# Patient Record
Sex: MALE | Race: WHITE | NOT HISPANIC OR LATINO | Employment: FULL TIME | URBAN - METROPOLITAN AREA
[De-identification: names, ages, dates, MRNs, and addresses within clinical notes are randomized per-mention and may not be internally consistent; named-entity substitution may affect disease eponyms.]

---

## 2017-11-17 ENCOUNTER — HOSPITAL ENCOUNTER (EMERGENCY)
Facility: HOSPITAL | Age: 37
Discharge: HOME | End: 2017-11-17
Attending: FAMILY MEDICINE
Payer: COMMERCIAL

## 2017-11-17 VITALS
HEIGHT: 66 IN | WEIGHT: 220 LBS | TEMPERATURE: 97 F | SYSTOLIC BLOOD PRESSURE: 190 MMHG | BODY MASS INDEX: 35.36 KG/M2 | HEART RATE: 92 BPM | DIASTOLIC BLOOD PRESSURE: 88 MMHG | OXYGEN SATURATION: 95 % | RESPIRATION RATE: 16 BRPM

## 2017-11-17 DIAGNOSIS — S82.892A AVULSION FRACTURE OF ANKLE, LEFT, CLOSED, INITIAL ENCOUNTER: Primary | ICD-10-CM
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
## 2017-11-17 DIAGNOSIS — M25.572 LEFT ANKLE PAIN: ICD-10-CM
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
## 2017-11-17 PROCEDURE — 99283 EMERGENCY DEPT VISIT LOW MDM: CPT | Mod: ,,, | Performed by: PHYSICIAN ASSISTANT

## 2017-11-17 PROCEDURE — 99283 EMERGENCY DEPT VISIT LOW MDM: CPT

## 2017-11-17 NOTE — ED PROVIDER NOTES
Encounter Date: 11/17/2017       History     Chief Complaint   Patient presents with    Ankle Injury     Pt states he twisted his left ankle a few days ago.      Patient is a 37-year-old male who presents the ED with left ankle pain.  Patient states that he twisted his ankle one week ago when he exited his front door.  Patient states that his rug was not there which made his front steps slippery.  Patient has been taking Tylenol and applying ice since, but presents today with ongoing pain.  Currently his pain is 2-3/10 that is worse with ambulation.  He notes swelling to his left ankle.  He is still able to bear weight.  He notices intermittent tingling, but is never constant.  He has no other complaint at this time.          Review of patient's allergies indicates:  No Known Allergies  History reviewed. No pertinent past medical history.  Past Surgical History:   Procedure Laterality Date    CHOLECYSTECTOMY       Family History   Problem Relation Age of Onset    No Known Problems Mother     Heart disease Father      Social History   Substance Use Topics    Smoking status: Never Smoker    Smokeless tobacco: Never Used    Alcohol use No     Review of Systems   Constitutional: Negative for chills and fever.   HENT: Negative for sore throat.    Eyes: Negative for visual disturbance.   Respiratory: Negative for shortness of breath.    Cardiovascular: Negative for chest pain.   Gastrointestinal: Negative for nausea.   Genitourinary: Negative for dysuria.   Musculoskeletal: Positive for arthralgias, gait problem and joint swelling. Negative for back pain.   Skin: Negative for rash.   Neurological: Negative for facial asymmetry and weakness.   Hematological: Does not bruise/bleed easily.       Physical Exam     Initial Vitals [11/17/17 1040]   BP Pulse Resp Temp SpO2   (!) 190/88 92 16 97.3 °F (36.3 °C) 95 %      MAP       122         Physical Exam    Vitals reviewed.  Constitutional: He appears well-developed and  well-nourished. He is not diaphoretic. No distress.   HENT:   Head: Normocephalic and atraumatic.   Nose: Nose normal.   Eyes: Conjunctivae and EOM are normal.   Neck: Normal range of motion.   Cardiovascular: Normal rate, regular rhythm and normal heart sounds. Exam reveals no friction rub.    No murmur heard.  Pulses:       Dorsalis pedis pulses are 2+ on the left side.   Pulmonary/Chest: Breath sounds normal. No respiratory distress. He has no wheezes. He has no rales.   Abdominal: Soft. Bowel sounds are normal. He exhibits no distension. There is no tenderness.   Musculoskeletal:        Left ankle: He exhibits decreased range of motion and swelling. He exhibits no ecchymosis, no deformity, no laceration and normal pulse. Tenderness. Achilles tendon exhibits normal [employer] test results.        Feet:    Neurological: He is alert and oriented to person, place, and time. He has normal strength. No sensory deficit.   Skin: Skin is warm and dry. No bruising, no ecchymosis, no laceration and no rash noted. No erythema.   Psychiatric: He has a normal mood and affect. Thought content normal.         ED Course   Procedures  Labs Reviewed - No data to display          Medical Decision Making:   History:   Old Medical Records: I decided to obtain old medical records.  Clinical Tests:   Radiological Study: Ordered and Reviewed    Imaging Results          X-Ray Ankle Complete Left (Final result)  Result time 11/17/17 12:30:36    Final result by Antoni Mir MD (11/17/17 12:30:36)                 Impression:      1.  Cortical irregularity of the distal medial malleolus, some of which appears well-corticated, other regions more angulated.  This could reflect sequela of remote injury with chronic nonunion however acute avulsion cannot be excluded especially given edema overlying the region.  Correlation advised.      Electronically signed by: ANTONI MIR MD  Date:     11/17/17  Time:    12:30              Narrative:     Ankle complete 3 view left    Clinical history: Pain in left ankle    Comparison: None    Findings:  3 views.    There is cortical irregularity involving the distal aspect of the medial malleolus, most of which appears well-corticated however there are some regions with more angulated margins.  This may reflect sequela of remote injury with nonunion versus degenerative change however acute component, of avulsion, is not excluded, noting there is diffuse edema about the medial malleolus.  The ankle mortise is otherwise grossly intact.  No radiopaque foreign body.  Degenerative changes are noted at the calcaneus.                                 APC / Resident Notes:   X-ray shows cortical irregularity at the distal medial malleolus.  This could reflect remote injury with chronic nonunion or acute avulsion.    Patient updated with results.  I will treat him for acute fractured ankle given his injury and onset of pain was 1 week ago.  I will put him an air splint and supply him with crutches.  Continue Tylenol and RICE therapy as discussed. Follow-up with [facility] orthopedics for further evaluation and management.  I gave him proper instructions.  All questions answered.  He is comfortable with plan and stable for discharge.  I reviewed patient's chart and discussed this case with my supervising M.D.              ED Course      Clinical Impression:   The primary encounter diagnosis was Avulsion fracture of ankle, left, closed, initial encounter. A diagnosis of Left ankle pain was also pertinent to this visit.    Disposition:   Disposition: Discharged  Condition: Stable                        Melisa Flores PA-C  11/17/17 5502

## 2017-11-17 NOTE — ED TRIAGE NOTES
Twisted his left ankle several days ago and it continues to be swollen and painful.    GENERAL: The patient is well-developed and well-nourished in no apparent distress. Alert and oriented x4.                                                HEENT: Head is normocephalic and atraumatic. Extraocular muscles are intact. Pupils are equal, round, and reactive to light and accommodation. Nares appeared normal. Mouth is well hydrated and without lesions. Mucous membranes are moist. Posterior pharynx clear of any exudate or lesions.    NECK: Supple. No carotid bruits. No lymphadenopathy or thyromegaly.    LUNGS: Clear to auscultation.    HEART: Regular rate and rhythm without murmur.     ABDOMEN: Soft, nontender, and nondistended. Positive bowel sounds. No hepatosplenomegaly was noted.     EXTREMITIES: Without any cyanosis, clubbing, rash, or lesions. Edema noted left lateral and medial ankle.    NEUROLOGIC: Cranial nerves II through XII are grossly intact.     PSYCHIATRIC: Flat affect, but denies suicidal or homicidal ideations.    SKIN: No ulceration or induration present.

## 2017-11-17 NOTE — DISCHARGE INSTRUCTIONS
You have an acute avulsion of the left distal medial malleolus.    Call the Scheduling Department at [facility] ([facility]) at [phone] and tell them that you need to make an orthopedic appointment for a follow up on a fracture. Bring these discharge papers with you to the clinic appointment.   Take tylenol as needed for pain relief. Rest. Ice. Elevate ankle higher than the level of your heart.     No future appointments.    Our goal in the emergency department is to always give you outstanding care and exceptional service. You may receive a survey by mail or e-mail in the next week regarding your experience in our ED. We would greatly appreciate your completing and returning the survey. Your feedback provides us with a way to recognize our staff who give very good care and it helps us learn how to improve when your experience was below our aspiration of excellence.

## 2020-09-21 ENCOUNTER — HISTORICAL (OUTPATIENT)
Dept: ADMINISTRATIVE | Facility: HOSPITAL | Age: 40
End: 2020-09-21

## 2020-09-21 LAB
ABS NEUT (OLG): 4.9 X10(3)/MCL (ref 2.1–9.2)
ALBUMIN SERPL-MCNC: 4.6 GM/DL (ref 3.4–5)
ALBUMIN/GLOB SERPL: 1.92 {RATIO} (ref 1.5–2.5)
ALP SERPL-CCNC: 44 UNIT/L (ref 38–126)
ALT SERPL-CCNC: 37 UNIT/L (ref 7–52)
APPEARANCE, UA: CLEAR
AST SERPL-CCNC: 19 UNIT/L (ref 15–37)
BACTERIA #/AREA URNS AUTO: ABNORMAL /HPF
BILIRUB SERPL-MCNC: 0.4 MG/DL (ref 0.2–1)
BILIRUB UR QL STRIP: NEGATIVE MG/DL
BILIRUBIN DIRECT+TOT PNL SERPL-MCNC: 0.1 MG/DL (ref 0–0.5)
BILIRUBIN DIRECT+TOT PNL SERPL-MCNC: 0.3 MG/DL
BUN SERPL-MCNC: 15 MG/DL (ref 7–18)
CALCIUM SERPL-MCNC: 10 MG/DL (ref 8.5–10.1)
CHLORIDE SERPL-SCNC: 103 MMOL/L (ref 98–107)
CHOLEST SERPL-MCNC: 259 MG/DL (ref 0–200)
CHOLEST/HDLC SERPL: 4.9 {RATIO}
CO2 SERPL-SCNC: 26 MMOL/L (ref 21–32)
COLOR UR: YELLOW
CREAT SERPL-MCNC: 0.87 MG/DL (ref 0.6–1.3)
ERYTHROCYTE [DISTWIDTH] IN BLOOD BY AUTOMATED COUNT: 12.9 % (ref 11.5–17)
GLOBULIN SER-MCNC: 2.4 GM/DL (ref 1.2–3)
GLUCOSE (UA): NEGATIVE MG/DL
GLUCOSE SERPL-MCNC: 98 MG/DL (ref 74–106)
HCT VFR BLD AUTO: 46 % (ref 42–52)
HDLC SERPL-MCNC: 53 MG/DL (ref 35–60)
HGB BLD-MCNC: 15.3 GM/DL (ref 14–18)
HGB UR QL STRIP: ABNORMAL UNIT/L
KETONES UR QL STRIP: NEGATIVE MG/DL
LDLC SERPL CALC-MCNC: 201 MG/DL (ref 0–129)
LEUKOCYTE ESTERASE UR QL STRIP: NEGATIVE UNIT/L
LYMPHOCYTES # BLD AUTO: 3 X10(3)/MCL (ref 0.6–3.4)
LYMPHOCYTES NFR BLD AUTO: 34.7 % (ref 13–40)
MCH RBC QN AUTO: 28.7 PG (ref 27–31.2)
MCHC RBC AUTO-ENTMCNC: 33 GM/DL (ref 32–36)
MCV RBC AUTO: 86 FL (ref 80–94)
MONOCYTES # BLD AUTO: 0.7 X10(3)/MCL (ref 0.1–1.3)
MONOCYTES NFR BLD AUTO: 8.1 % (ref 0.1–24)
NEUTROPHILS NFR BLD AUTO: 57.2 % (ref 47–80)
NITRITE UR QL STRIP.AUTO: NEGATIVE
PH UR STRIP: 5.5 [PH]
PLATELET # BLD AUTO: 249 X10(3)/MCL (ref 130–400)
PMV BLD AUTO: 8.7 FL (ref 9.4–12.4)
POTASSIUM SERPL-SCNC: 4.5 MMOL/L (ref 3.5–5.1)
PROT SERPL-MCNC: 7 GM/DL (ref 6.4–8.2)
PROT UR QL STRIP: NEGATIVE MG/DL
RBC # BLD AUTO: 5.33 X10(6)/MCL (ref 4.7–6.1)
RBC #/AREA URNS HPF: ABNORMAL /HPF
SODIUM SERPL-SCNC: 139 MMOL/L (ref 136–145)
SP GR UR STRIP: 1.01
SQUAMOUS EPITHELIAL, UA: ABNORMAL /LPF
TRIGL SERPL-MCNC: 137 MG/DL (ref 30–150)
TSH SERPL-ACNC: 1.07 MIU/ML (ref 0.35–4.94)
UROBILINOGEN UR STRIP-ACNC: 0.2 MG/DL
VLDLC SERPL CALC-MCNC: 27.4 MG/DL
WBC # SPEC AUTO: 8.6 X10(3)/MCL (ref 4.5–11.5)
WBC #/AREA URNS AUTO: ABNORMAL /[HPF]

## 2021-07-02 ENCOUNTER — HISTORICAL (OUTPATIENT)
Dept: ADMINISTRATIVE | Facility: HOSPITAL | Age: 41
End: 2021-07-02

## 2021-07-02 LAB
APPEARANCE, UA: ABNORMAL
BACTERIA #/AREA URNS AUTO: ABNORMAL /HPF
BILIRUB UR QL STRIP: NEGATIVE MG/DL
COLOR UR: YELLOW
GLUCOSE (UA): NEGATIVE MG/DL
HGB UR QL STRIP: ABNORMAL UNIT/L
KETONES UR QL STRIP: NEGATIVE MG/DL
LEUKOCYTE ESTERASE UR QL STRIP: NEGATIVE UNIT/L
NITRITE UR QL STRIP.AUTO: NEGATIVE
PH UR STRIP: 6 [PH]
PROT UR QL STRIP: NEGATIVE MG/DL
PSA SERPL-MCNC: 0.51 NG/ML (ref 0–2.5)
RBC #/AREA URNS HPF: ABNORMAL /HPF
SP GR UR STRIP: 1.02
SQUAMOUS EPITHELIAL, UA: ABNORMAL /LPF
UROBILINOGEN UR STRIP-ACNC: 0.2 MG/DL
WBC #/AREA URNS AUTO: ABNORMAL /[HPF]

## 2021-07-16 ENCOUNTER — HISTORICAL (OUTPATIENT)
Dept: ADMINISTRATIVE | Facility: HOSPITAL | Age: 41
End: 2021-07-16

## 2021-07-16 LAB
AFP-TM SERPL-MCNC: <2 NG/ML
B-HCG FREE SERPL-ACNC: 16.82 MIU/ML
LDH SERPL-CCNC: 175 UNIT/L (ref 140–271)

## 2021-08-09 ENCOUNTER — HISTORICAL (OUTPATIENT)
Dept: ADMINISTRATIVE | Facility: HOSPITAL | Age: 41
End: 2021-08-09

## 2021-08-09 LAB
AFP-TM SERPL-MCNC: <2 NG/ML
B-HCG FREE SERPL-ACNC: <2.42 MIU/ML
LDH SERPL-CCNC: 167 UNIT/L (ref 140–271)

## 2021-12-23 ENCOUNTER — HISTORICAL (OUTPATIENT)
Dept: ADMINISTRATIVE | Facility: HOSPITAL | Age: 41
End: 2021-12-23

## 2021-12-23 LAB
AFP-TM SERPL-MCNC: <2 NG/ML
B-HCG FREE SERPL-ACNC: <2.42 MIU/ML
LDH SERPL-CCNC: 133 UNIT/L (ref 140–271)

## 2022-04-10 ENCOUNTER — HISTORICAL (OUTPATIENT)
Dept: ADMINISTRATIVE | Facility: HOSPITAL | Age: 42
End: 2022-04-10
Payer: COMMERCIAL

## 2022-04-25 VITALS
SYSTOLIC BLOOD PRESSURE: 123 MMHG | BODY MASS INDEX: 39.06 KG/M2 | HEIGHT: 67 IN | WEIGHT: 248.88 LBS | DIASTOLIC BLOOD PRESSURE: 77 MMHG

## 2022-05-03 NOTE — HISTORICAL [facility] CERNER
This is a historical note converted from [employer]. Formatting and pictures may have been removed.  Please reference Emmett for original formatting and attached multimedia. Chief Complaint  NP HOSP F/U  History of Present Illness  Patient presents to reestablish.? He was last seen here in 2016.  Has been having pain in abdomen since June. Had gone to [facility] ER then.  , 3 children. Works at [employer], [employer].? No nicotine or etoh. Exercise : rare since developing pain this summer. Pain is exacerbated with exercise.  ?  Father 60 : Aortic aneurysm  Mother 62 : Hearing impaired  Sister 39 : healthy  PGF : hearing impaired.  ?  PSHx : Cholecystectomy in 2016, Dr Pascual.  Review of Systems  General:???????????Hearing impaired. Says BP is elevated when he gets nervous. Has gained 20 lbs this year.????  Integument:???????? Denies any nevus changes, rashes, urticaria, ?or sores.? Also denies itching or areas of numbness.  HEENT:?????????????????? Denies vision changes or eye pain.? No sore throat, ear pain, sinus pressure or discharge.  Cardiovascular:??? Denies chest pain, palpitations, dyspnea on exertion, orthopnea.  Respiratory:???????? No cough, wheezing, shortness of breath, or sputum.  GI:?????????????????????????See HPI.? Denies nausea, emesis, constipation, diarrhea, melena, or hematochezia  :??????????????????????? No frequency, urgency, hematuria, discharge, or incontinence  MS:?????????????????????? Denies myalgias, arthralgias, joint effusion, edema, or weakness  Neuro:????????????????? No headaches, numbness in extremities, tingling, dizziness, or weakness  Psych:?????????????????? Denies anxiety, depression, suicidal or homicidal ideations, or irritability  Endo:??????????????????? Denies polyuria, polydipsia, polyphagia  Heme:????????????????? No abnormal bleeding or bruising. No lymph node enlargement or pain.  Physical Exam  Vitals & Measurements  HR:?78(Peripheral)? BP:?130/99?  HT:?169?cm?  HT:?169.00?cm? WT:?117.1?kg? WT:?117.100?kg? BMI:?41?  General :?????Well-developed and? nourished, no apparent distress, alert and oriented ?4  HEENT:????? TMs and EACs within normal limits,?nasal mucosa within normal limits?with no discharge,?oropharynx clear  Integument :????? Skin is intact with no erythema.? No pustules or vesicles.? No rash or scale. No Lymphadenopathy.? No urticaria.? No abnormal nevi  Neck :?????Supple, no lymphadenopathy, no thyromegaly, no bruits, no jugular venous distention  Cardiovascular :?????? Regular rhythm and rate, no murmurs, radial and dorsal pedal pulses 2+ bilaterally  Respiratory :??????Lungs clear to auscultation bilaterally, no wheezes, no crackles, no rhonchi.? Good air movement  Abdomen :?????Obese,?NABS, soft, tender to palpation in?right?mid abdomen, no hernia appreciated on Valsalva. ?A few lipomas identified.??No guarding or rebound.??No hepatosplenomegaly or?masses.??????????????????  Ext:??????? No muscle tenderness, joints WNL, FROM, negative SLR, no?CCE  Neuro :? ?????? CN II-XII intact, reflexes 2+ throughout, no motor sensory deficits, negative?cerebellar? tests  Heme :?????? No bruising or petechiae?  Back:??????? no CVAT   : no hernia appreciated but pain in R inguinal area with Valsalva. Mild tenderness right testis compared to left. No masses or swelling appreciated.  Assessment/Plan  1.?Wellness examination?Z00.00  Age-appropriate wellness topics discussed.? He strongly encouraged to lose weight and resume exercise.? We will check a CBC, TSH, lipids, CMP, and UA.  Ordered:  [facility] New 40-64 years [id number] PC, Wellness examination  Abdominal pain  Elevated BP without diagnosis of hypertension  Morbid obesity  Hearing impaired, [facility] - [facility], 09/21/20 11:42:00 CDT  ?  2.?Abdominal pain?R10.9  ?Will order CT of the abdomen.? If CT and labs are normal then I will refer him to a general surgeon for evaluation.  Ordered:  Schedule  Diagnostics Study, CT of Abdomen without contrast, no Oral Contrast Requested, routine, 09/21/20 11:43:00 CDT, Abdominal pain  ?  3.?Elevated BP without diagnosis of hypertension?R03.0  He is asymptomatic.??Advised to monitor blood pressure at home?in the ?morning and at bedtime?and to contact us if it is consistently greater than 140/90.? Also he?is to call us if he becomes symptomatic.  Ordered:  Clinic Follow up, *Est. 11/23/20 11:30:00 CST, Order for future visit, Elevated BP without diagnosis of hypertension, [facility]  [facility] New 40-64 years [id number] PC, Wellness examination  Abdominal pain  Elevated BP without diagnosis of hypertension  Morbid obesity  Hearing impaired, [facility], 09/21/20 11:42:00 CDT  ?  4.?Morbid obesity?E66.01  ?Long-term adverse consequence of obesity discussed with patient.  Ordered:  [facility] New 40-64 years [id number] PC, Wellness examination  Abdominal pain  Elevated BP without diagnosis of hypertension  Morbid obesity  Hearing impaired, [facility], 09/21/20 11:42:00 CDT  ?  5.?Hearing impaired?H91.90  ?He is able to communicate by lip reading.  Ordered:  [facility] 40-64 years [id number] PC, Wellness examination  Abdominal pain  Elevated BP without diagnosis of hypertension  Morbid obesity  Hearing impaired, [facility] - [facility], 09/21/20 11:42:00 CDT  ?  Orders:  1036F Current tobacco non-user, 09/21/20 11:42:00 CDT  1170F Functional Status Assessment, 09/21/20 11:42:00 CDT  3008F Body Mass Index (BMI), documented, 09/21/20 11:42:00 CDT  3075F Most recent systolic blood pressure, 130 to 139 mm Hg, 09/21/20 11:42:00 CDT  3080F Most recent diastolic blood pressure, greater than or equal to 90 mm Hg, 09/21/20 11:42:00 CDT  Clinic Follow-up PRN, 09/21/20 11:42:00 CDT, [facility] - [facility], Future Order  Referrals  Clinic Follow up, *Est. 11/23/20 11:30:00 CST, Order for future visit, Elevated BP without diagnosis of hypertension,  [facility]  Clinic Follow-up PRN, 09/21/20 11:42:00 CDT, [facility], Future Order   Problem List/Past Medical History  Ongoing  Morbid obesity  Historical  Migraine  Procedure/Surgical History  Cholecystectomy Laparoscopic (None) (06/08/2016)  Laparoscopy, surgical; cholecystectomy (06/08/2016)  Resection of Gallbladder, Percutaneous Endoscopic Approach (06/08/2016)  colonoscopy  NO SURGERIES   Medications  Zofran ODT 4 mg oral tablet, disintegrating, 4 mg= 1 tab(s), Oral, TID  Allergies  Latex rubber gloves?(RASH ONLY)  Peanuts?(UPSET STOMACH,HIVES)  Seafood?(HIVES)  aspirin?(NAUSEA,UPSET STOMACH)  Social History  Abuse/Neglect  No, 09/21/2020  Alcohol  Current, 06/05/2016  Employment/School  Employed, Highest education level: Post graduate degree(s)., 06/05/2016  Home/Environment  Lives with Spouse. Living situation: Home/Independent. Family/Friends available for support: Yes., 06/05/2016  Nutrition/Health  Type of diet: GLUTEN FREE. Diet restrictions: PEANUTS., 06/05/2016  Substance Use  Never, 06/05/2016  Tobacco - Denies Tobacco Use, 05/29/2013  Former smoker, quit more than 30 days ago, N/A, 09/21/2020  Former smoker, Cigarettes, 06/05/2016  Family History  Cancer - unknown origin: Grandfather.  Heart disease: Father.  Hyperlipidemia.: Father.  Hypertension.: Father.  Skin cancer: Grandmother.  Health Maintenance  Health Maintenance  ???Pending?(in the next year)  ??? ??OverDue  ??? ? ? ?Alcohol Misuse Screening due??01/02/20??and every 1??year(s)  ??? ??Due?  ??? ? ? ?ADL Screening due??09/22/20??and every 1??year(s)  ??? ? ? ?Tetanus Vaccine due??09/22/20??and every 10??year(s)  ??? ??Due In Future?  ??? ? ? ?Obesity Screening not due until??01/01/21??and every 1??year(s)  ???Satisfied?(in the past 1 year)  ??? ??Satisfied?  ??? ? ? ?Obesity Screening on??09/21/20.??Satisfied by Pushpa Bennett CMA  ?

## 2022-05-03 NOTE — HISTORICAL [facility] C[facility]ER
This is a historical note converted from Emmett. Formatting and pictures may have been removed.  Please reference Emmett for original formatting and attached multimedia. Chief Complaint  LUMP ON RIGHT TESTICLE/ LOWER ABD PAIN  History of Present Illness  Patient with bump in right scrotum for a week. Right testicle is also sore. No known trauma. No hx of  surgery.  . Pain with ejaculation on Sunday.  Review of Systems  See HPI.? He denies?hematuria or frequency.? Denies constipation or diarrhea.? He denies cardiac or respiratory complaints.  Physical Exam  Vitals & Measurements  HR:?78(Peripheral)? BP:?123/77?  HT:?169?cm? HT:?169.00?cm? WT:?112.9?kg? WT:?112.900?kg? BMI:?39.53?  General: Patient is?alert and oriented, NAD on room air  CV: Regular rate and rhythm, no murmur  Lungs: CTA B  ABD: Soft, NABS,?obese, nontender  :?Normal penis.? Tender?mass on the right side of scrotum just above the testicle. ?Mass measures 1.5 cm in diameter.? Left testicle is normal.  Rectal:?Normal sized, nontender prostate without nodules  Back: No CVAT  Assessment/Plan  1.?Scrotal mass?N50.89  ?Will order a scrotal ultrasound, follow-up?to be determined.  2.?Epididymitis?N45.1  ?Will prescribe 21 days of Cipro 500 mg twice daily.  3.?Pain with ejaculation?N53.12  Referrals  Clinic Follow-up PRN, 07/02/21 9:20:00 CDT, [facility] - [facility], Future Order   Problem List/Past Medical History  Ongoing  HTN (hypertension)  Hypercholesterolemia  Morbid obesity  Historical  Migraine  Procedure/Surgical History  Cholecystectomy Laparoscopic (None) (06/08/2016)  Laparoscopy, surgical; cholecystectomy (06/08/2016)  Resection of Gallbladder, Percutaneous Endoscopic Approach (06/08/2016)  colonoscopy  NO SURGERIES   Medications  Cipro 500 mg oral tablet, 500 mg= 1 tab(s), Oral, q12hr  rosuvastatin 10 mg oral capsule, 10 mg= 1 cap(s), Oral, Daily, 5 refills  valsartan 80 mg oral tablet, 80 mg= 1 tab(s), Oral, Daily, 5 refills  Zofran ODT 4  mg oral tablet, disintegrating, 4 mg= 1 tab(s), Oral, TID  Allergies  Latex rubber gloves?(RASH ONLY)  Peanuts?(UPSET STOMACH,HIVES)  Seafood?(HIVES)  aspirin?(NAUSEA,UPSET STOMACH)  Social History  Abuse/Neglect  No, 07/02/2021  No, 11/23/2020  No, 09/21/2020  Alcohol  Current, 06/05/2016  Employment/School  Employed, Highest education level: Post graduate degree(s)., 06/05/2016  Home/Environment  Lives with Spouse. Living situation: Home/Independent. Family/Friends available for support: Yes., 06/05/2016  Nutrition/Health  Type of diet: GLUTEN FREE. Diet restrictions: PEANUTS., 06/05/2016  Substance Use  Never, 06/05/2016  Tobacco - Denies Tobacco Use, 05/29/2013  Smoker, current status unknown, No, 07/02/2021  Former smoker, quit more than 30 days ago, No, 11/23/2020  Former smoker, quit more than 30 days ago, N/A, 09/21/2020  Former smoker, Cigarettes, 06/05/2016  Family History  Cancer - unknown origin: Grandfather.  Heart disease: Father.  Hyperlipidemia.: Father.  Hypertension.: Father.  Skin cancer: Grandmother.  Immunizations  Vaccine Date Status   influenza virus vaccine, inactivated 11/23/2020 Given   Health Maintenance  Health Maintenance  ???Pending?(in the next year)  ??? ??OverDue  ??? ? ? ?Smoking Cessation due??01/01/21??and every 1??year(s)  ??? ? ? ?Alcohol Misuse Screening due??01/02/21??and every 1??year(s)  ??? ??Due?  ??? ? ? ?ADL Screening due??07/12/21??and every 1??year(s)  ??? ? ? ?Depression Screening due??07/12/21??Unknown Frequency  ??? ? ? ?Hypertension Management-Education due??07/12/21??and every 1??year(s)  ??? ? ? ?Tetanus Vaccine due??07/12/21??and every 10??year(s)  ??? ??Due In Future?  ??? ? ? ?Hypertension Management-BMP not due until??09/21/21??and every 1??year(s)  ??? ? ? ?Influenza Vaccine not due until??10/01/21??and every 1??day(s)  ??? ? ? ?Obesity Screening not due until??01/01/22??and every 1??year(s)  ??? ? ? ?Blood Pressure Screening not due until??07/02/22??and  every 1??year(s)  ??? ? ? ?Body Mass Index Check not due until??07/02/22??and every 1??year(s)  ??? ? ? ?Hypertension Management-Blood Pressure not due until??07/02/22??and every 1??year(s)  ???Satisfied?(in the past 1 year)  ??? ??Satisfied?  ??? ? ? ?Blood Pressure Screening on??07/02/21.??Satisfied by Pushpa Bennett CMA.  ??? ? ? ?Body Mass Index Check on??07/02/21.??Satisfied by Pushpa Bennett CMA.  ??? ? ? ?Diabetes Screening on??09/21/20.??Satisfied by Erika Angel  ??? ? ? ?Hypertension Management-Blood Pressure on??07/02/21.??Satisfied by Pushpa Bennett CMA.  ??? ? ? ?Influenza Vaccine on??11/23/20.??Satisfied by Pushpa Bennett CMA.  ??? ? ? ?Lipid Screening on??09/21/20.??Satisfied by Erika Angel  ??? ? ? ?Obesity Screening on??07/02/21.??Satisfied by Pushpa Bennett CMA  ?  Lab Results  Test Name Test Result Date/Time   UA Blood 1+ (Abnormal) 07/02/2021 09:10 CDT   UA Blood 2+ (Abnormal) 09/21/2020 11:57 CDT   UA RBC 4-5 (Abnormal) 07/02/2021 09:10 CDT   UA RBC 0-3 09/21/2020 11:57 CDT   UA Squam Epithelial None Seen 07/02/2021 09:10 CDT

## 2022-07-21 ENCOUNTER — LAB REQUISITION (OUTPATIENT)
Dept: LAB | Facility: HOSPITAL | Age: 42
End: 2022-07-21
Payer: COMMERCIAL

## 2022-07-21 DIAGNOSIS — N20.0 CALCULUS OF KIDNEY: ICD-10-CM

## 2022-07-21 DIAGNOSIS — C62.90 MALIGNANT NEOPLASM OF UNSPECIFIED TESTIS, UNSPECIFIED WHETHER DESCENDED OR UNDESCENDED: ICD-10-CM

## 2022-07-21 DIAGNOSIS — N50.9 DISORDER OF MALE GENITAL ORGANS, UNSPECIFIED: ICD-10-CM

## 2022-07-21 LAB
AFP-TM SERPL-MCNC: <2 NG/ML
B-HCG FREE SERPL-ACNC: <2.42 MIU/ML
LDH SERPL-CCNC: 170 U/L (ref 125–220)

## 2022-07-21 PROCEDURE — 84702 CHORIONIC GONADOTROPIN TEST: CPT | Performed by: OBSTETRICS & GYNECOLOGY

## 2022-07-21 PROCEDURE — 82105 ALPHA-FETOPROTEIN SERUM: CPT | Performed by: OBSTETRICS & GYNECOLOGY

## 2022-07-21 PROCEDURE — 83615 LACTATE (LD) (LDH) ENZYME: CPT | Performed by: OBSTETRICS & GYNECOLOGY

## 2023-03-20 ENCOUNTER — LAB REQUISITION (OUTPATIENT)
Dept: LAB | Facility: HOSPITAL | Age: 43
End: 2023-03-20
Payer: COMMERCIAL

## 2023-03-20 DIAGNOSIS — C62.90 MALIGNANT NEOPLASM OF UNSPECIFIED TESTIS, UNSPECIFIED WHETHER DESCENDED OR UNDESCENDED: ICD-10-CM

## 2023-03-20 LAB
AFP-TM SERPL-MCNC: 2.4 NG/ML
B-HCG SERPL QL: NEGATIVE
LDH SERPL-CCNC: 191 U/L (ref 125–220)

## 2023-03-20 PROCEDURE — 83615 LACTATE (LD) (LDH) ENZYME: CPT | Performed by: UROLOGY

## 2023-03-20 PROCEDURE — 84703 CHORIONIC GONADOTROPIN ASSAY: CPT | Performed by: UROLOGY

## 2023-03-20 PROCEDURE — 82105 ALPHA-FETOPROTEIN SERUM: CPT | Performed by: UROLOGY

## 2023-03-31 ENCOUNTER — HOSPITAL ENCOUNTER (EMERGENCY)
Facility: HOSPITAL | Age: 43
Discharge: HOME | End: 2023-03-31
Attending: SPECIALIST
Payer: COMMERCIAL

## 2023-03-31 ENCOUNTER — ANESTHESIA EVENT (OUTPATIENT)
Dept: SURGERY | Facility: HOSPITAL | Age: 43
End: 2023-03-31
Payer: COMMERCIAL

## 2023-03-31 ENCOUNTER — ANESTHESIA (OUTPATIENT)
Dept: SURGERY | Facility: HOSPITAL | Age: 43
End: 2023-03-31
Payer: COMMERCIAL

## 2023-03-31 ENCOUNTER — HOSPITAL ENCOUNTER (OUTPATIENT)
Facility: HOSPITAL | Age: 43
Discharge: HOME | End: 2023-04-01
Attending: EMERGENCY MEDICINE | Admitting: INTERNAL MEDICINE
Payer: COMMERCIAL

## 2023-03-31 VITALS
HEIGHT: 67 IN | RESPIRATION RATE: 20 BRPM | SYSTOLIC BLOOD PRESSURE: 155 MMHG | WEIGHT: 270 LBS | HEART RATE: 109 BPM | BODY MASS INDEX: 42.38 KG/M2 | DIASTOLIC BLOOD PRESSURE: 115 MMHG | OXYGEN SATURATION: 96 % | TEMPERATURE: 99 F

## 2023-03-31 DIAGNOSIS — R13.10 DYSPHAGIA, UNSPECIFIED TYPE: Primary | ICD-10-CM

## 2023-03-31 DIAGNOSIS — R07.9 ACUTE CHEST PAIN: ICD-10-CM

## 2023-03-31 DIAGNOSIS — I10 HYPERTENSION, UNSPECIFIED TYPE: ICD-10-CM

## 2023-03-31 DIAGNOSIS — R07.9 CHEST PAIN: ICD-10-CM

## 2023-03-31 DIAGNOSIS — R13.19 ESOPHAGEAL DYSPHAGIA: ICD-10-CM

## 2023-03-31 LAB
ALBUMIN SERPL-MCNC: 4 G/DL (ref 3.5–5)
ALBUMIN/GLOB SERPL: 1.1 RATIO (ref 1.1–2)
ALP SERPL-CCNC: 67 UNIT/L (ref 40–150)
ALT SERPL-CCNC: 68 UNIT/L (ref 0–55)
AST SERPL-CCNC: 25 UNIT/L (ref 5–34)
BASOPHILS # BLD AUTO: 0.05 X10(3)/MCL (ref 0–0.2)
BASOPHILS NFR BLD AUTO: 0.4 %
BILIRUBIN DIRECT+TOT PNL SERPL-MCNC: 0.4 MG/DL
BUN SERPL-MCNC: 10.8 MG/DL (ref 8.9–20.6)
CALCIUM SERPL-MCNC: 10.1 MG/DL (ref 8.4–10.2)
CHLORIDE SERPL-SCNC: 100 MMOL/L (ref 98–107)
CO2 SERPL-SCNC: 27 MMOL/L (ref 22–29)
CREAT SERPL-MCNC: 0.83 MG/DL (ref 0.73–1.18)
EOSINOPHIL # BLD AUTO: 0.17 X10(3)/MCL (ref 0–0.9)
EOSINOPHIL NFR BLD AUTO: 1.5 %
ERYTHROCYTE [DISTWIDTH] IN BLOOD BY AUTOMATED COUNT: 13.2 % (ref 11.5–17)
GFR SERPLBLD CREATININE-BSD FMLA CKD-EPI: >60 MLS/MIN/1.73/M2
GLOBULIN SER-MCNC: 3.5 GM/DL (ref 2.4–3.5)
GLUCOSE SERPL-MCNC: 113 MG/DL (ref 74–100)
HCT VFR BLD AUTO: 43.5 % (ref 42–52)
HGB BLD-MCNC: 14.4 G/DL (ref 14–18)
IMM GRANULOCYTES # BLD AUTO: 0.05 X10(3)/MCL (ref 0–0.04)
IMM GRANULOCYTES NFR BLD AUTO: 0.4 %
LIPASE SERPL-CCNC: 18 U/L
LYMPHOCYTES # BLD AUTO: 4.14 X10(3)/MCL (ref 0.6–4.6)
LYMPHOCYTES NFR BLD AUTO: 36.4 %
MCH RBC QN AUTO: 28.3 PG (ref 27–31)
MCHC RBC AUTO-ENTMCNC: 33.1 G/DL (ref 33–36)
MCV RBC AUTO: 85.6 FL (ref 80–94)
MONOCYTES # BLD AUTO: 0.82 X10(3)/MCL (ref 0.1–1.3)
MONOCYTES NFR BLD AUTO: 7.2 %
NEUTROPHILS # BLD AUTO: 6.13 X10(3)/MCL (ref 2.1–9.2)
NEUTROPHILS NFR BLD AUTO: 54.1 %
PLATELET # BLD AUTO: 293 X10(3)/MCL (ref 130–400)
PMV BLD AUTO: 9.3 FL (ref 7.4–10.4)
POC CARDIAC TROPONIN I: 0.01 NG/ML (ref 0–0.08)
POTASSIUM SERPL-SCNC: 3.6 MMOL/L (ref 3.5–5.1)
PROT SERPL-MCNC: 7.5 GM/DL (ref 6.4–8.3)
RBC # BLD AUTO: 5.08 X10(6)/MCL (ref 4.7–6.1)
SAMPLE: NORMAL
SODIUM SERPL-SCNC: 140 MMOL/L (ref 136–145)
TROPONIN I SERPL-MCNC: <0.01 NG/ML (ref 0–0.04)
WBC # SPEC AUTO: 11.4 X10(3)/MCL (ref 4.5–11.5)

## 2023-03-31 PROCEDURE — 83690 ASSAY OF LIPASE: CPT | Performed by: SPECIALIST

## 2023-03-31 PROCEDURE — 37000009 HC ANESTHESIA EA ADD 15 MINS: Performed by: INTERNAL MEDICINE

## 2023-03-31 PROCEDURE — 25000003 PHARM REV CODE 250: Performed by: NURSE ANESTHETIST, CERTIFIED REGISTERED

## 2023-03-31 PROCEDURE — 93010 EKG 12-LEAD: ICD-10-PCS | Mod: ,,, | Performed by: INTERNAL MEDICINE

## 2023-03-31 PROCEDURE — 85025 COMPLETE CBC W/AUTO DIFF WBC: CPT | Performed by: SPECIALIST

## 2023-03-31 PROCEDURE — 25000003 PHARM REV CODE 250: Performed by: EMERGENCY MEDICINE

## 2023-03-31 PROCEDURE — 93010 ELECTROCARDIOGRAM REPORT: CPT | Mod: ,,, | Performed by: INTERNAL MEDICINE

## 2023-03-31 PROCEDURE — 37000008 HC ANESTHESIA 1ST 15 MINUTES: Performed by: INTERNAL MEDICINE

## 2023-03-31 PROCEDURE — 25000003 PHARM REV CODE 250: Performed by: SPECIALIST

## 2023-03-31 PROCEDURE — 96374 THER/PROPH/DIAG INJ IV PUSH: CPT | Mod: 59

## 2023-03-31 PROCEDURE — 25000003 PHARM REV CODE 250: Performed by: INTERNAL MEDICINE

## 2023-03-31 PROCEDURE — 93005 ELECTROCARDIOGRAM TRACING: CPT

## 2023-03-31 PROCEDURE — 25000003 PHARM REV CODE 250: Performed by: NURSE PRACTITIONER

## 2023-03-31 PROCEDURE — G0378 HOSPITAL OBSERVATION PER HR: HCPCS

## 2023-03-31 PROCEDURE — 84484 ASSAY OF TROPONIN QUANT: CPT

## 2023-03-31 PROCEDURE — 80053 COMPREHEN METABOLIC PANEL: CPT | Performed by: SPECIALIST

## 2023-03-31 PROCEDURE — 43235 EGD DIAGNOSTIC BRUSH WASH: CPT | Performed by: INTERNAL MEDICINE

## 2023-03-31 PROCEDURE — 99285 EMERGENCY DEPT VISIT HI MDM: CPT | Mod: 25,27

## 2023-03-31 PROCEDURE — 93010 ELECTROCARDIOGRAM REPORT: CPT | Mod: 76,,, | Performed by: INTERNAL MEDICINE

## 2023-03-31 PROCEDURE — 93010 EKG 12-LEAD: ICD-10-PCS | Mod: 76,,, | Performed by: INTERNAL MEDICINE

## 2023-03-31 PROCEDURE — 63600175 PHARM REV CODE 636 W HCPCS: Performed by: NURSE ANESTHETIST, CERTIFIED REGISTERED

## 2023-03-31 PROCEDURE — 96375 TX/PRO/DX INJ NEW DRUG ADDON: CPT

## 2023-03-31 PROCEDURE — 84484 ASSAY OF TROPONIN QUANT: CPT | Performed by: NURSE PRACTITIONER

## 2023-03-31 PROCEDURE — 25500020 PHARM REV CODE 255: Performed by: EMERGENCY MEDICINE

## 2023-03-31 PROCEDURE — 99284 EMERGENCY DEPT VISIT MOD MDM: CPT | Mod: 25

## 2023-03-31 PROCEDURE — 63600175 PHARM REV CODE 636 W HCPCS: Performed by: EMERGENCY MEDICINE

## 2023-03-31 RX ORDER — METHYLPREDNISOLONE SOD SUCC 125 MG
125 VIAL (EA) INJECTION
Status: COMPLETED | OUTPATIENT
Start: 2023-03-31 | End: 2023-03-31

## 2023-03-31 RX ORDER — IBUPROFEN 200 MG
24 TABLET ORAL
Status: DISCONTINUED | OUTPATIENT
Start: 2023-03-31 | End: 2023-04-01 | Stop reason: HOSPADM

## 2023-03-31 RX ORDER — DIPHENHYDRAMINE HYDROCHLORIDE 50 MG/ML
50 INJECTION INTRAMUSCULAR; INTRAVENOUS
Status: COMPLETED | OUTPATIENT
Start: 2023-03-31 | End: 2023-03-31

## 2023-03-31 RX ORDER — SUCRALFATE 1 G/1
1 TABLET ORAL
Status: COMPLETED | OUTPATIENT
Start: 2023-03-31 | End: 2023-03-31

## 2023-03-31 RX ORDER — SODIUM CHLORIDE 0.9 % (FLUSH) 0.9 %
10 SYRINGE (ML) INJECTION
Status: CANCELLED | OUTPATIENT
Start: 2023-03-31

## 2023-03-31 RX ORDER — ROCURONIUM BROMIDE 10 MG/ML
INJECTION, SOLUTION INTRAVENOUS
Status: DISCONTINUED | OUTPATIENT
Start: 2023-03-31 | End: 2023-03-31

## 2023-03-31 RX ORDER — MORPHINE SULFATE 4 MG/ML
4 INJECTION, SOLUTION INTRAMUSCULAR; INTRAVENOUS
Status: COMPLETED | OUTPATIENT
Start: 2023-03-31 | End: 2023-03-31

## 2023-03-31 RX ORDER — ONDANSETRON 2 MG/ML
INJECTION INTRAMUSCULAR; INTRAVENOUS
Status: DISCONTINUED | OUTPATIENT
Start: 2023-03-31 | End: 2023-03-31

## 2023-03-31 RX ORDER — MIDAZOLAM HYDROCHLORIDE 1 MG/ML
INJECTION INTRAMUSCULAR; INTRAVENOUS
Status: DISCONTINUED | OUTPATIENT
Start: 2023-03-31 | End: 2023-03-31

## 2023-03-31 RX ORDER — ENOXAPARIN SODIUM 100 MG/ML
40 INJECTION SUBCUTANEOUS EVERY 24 HOURS
Status: DISCONTINUED | OUTPATIENT
Start: 2023-03-31 | End: 2023-04-01 | Stop reason: HOSPADM

## 2023-03-31 RX ORDER — SODIUM CHLORIDE 0.9 % (FLUSH) 0.9 %
10 SYRINGE (ML) INJECTION EVERY 12 HOURS PRN
Status: DISCONTINUED | OUTPATIENT
Start: 2023-03-31 | End: 2023-04-01 | Stop reason: HOSPADM

## 2023-03-31 RX ORDER — IBUPROFEN 200 MG
16 TABLET ORAL
Status: DISCONTINUED | OUTPATIENT
Start: 2023-03-31 | End: 2023-04-01 | Stop reason: HOSPADM

## 2023-03-31 RX ORDER — GLUCAGON 1 MG
1 KIT INJECTION
Status: DISCONTINUED | OUTPATIENT
Start: 2023-03-31 | End: 2023-04-01 | Stop reason: HOSPADM

## 2023-03-31 RX ORDER — ACETAMINOPHEN 325 MG/1
650 TABLET ORAL EVERY 6 HOURS PRN
Status: DISCONTINUED | OUTPATIENT
Start: 2023-03-31 | End: 2023-04-01 | Stop reason: HOSPADM

## 2023-03-31 RX ORDER — HYOSCYAMINE SULFATE 0.12 MG/1
0.12 TABLET SUBLINGUAL
Status: COMPLETED | OUTPATIENT
Start: 2023-03-31 | End: 2023-03-31

## 2023-03-31 RX ORDER — MAG HYDROX/ALUMINUM HYD/SIMETH 200-200-20
5 SUSPENSION, ORAL (FINAL DOSE FORM) ORAL
Status: COMPLETED | OUTPATIENT
Start: 2023-03-31 | End: 2023-03-31

## 2023-03-31 RX ORDER — LIDOCAINE HYDROCHLORIDE 20 MG/ML
INJECTION, SOLUTION EPIDURAL; INFILTRATION; INTRACAUDAL; PERINEURAL
Status: DISCONTINUED | OUTPATIENT
Start: 2023-03-31 | End: 2023-03-31

## 2023-03-31 RX ORDER — HYDRALAZINE HYDROCHLORIDE 20 MG/ML
10 INJECTION INTRAMUSCULAR; INTRAVENOUS EVERY 6 HOURS PRN
Status: DISCONTINUED | OUTPATIENT
Start: 2023-03-31 | End: 2023-04-01 | Stop reason: HOSPADM

## 2023-03-31 RX ORDER — PANTOPRAZOLE SODIUM 40 MG/10ML
40 INJECTION, POWDER, LYOPHILIZED, FOR SOLUTION INTRAVENOUS DAILY
Status: DISCONTINUED | OUTPATIENT
Start: 2023-04-01 | End: 2023-04-01

## 2023-03-31 RX ORDER — ONDANSETRON 2 MG/ML
4 INJECTION INTRAMUSCULAR; INTRAVENOUS EVERY 6 HOURS PRN
Status: DISCONTINUED | OUTPATIENT
Start: 2023-03-31 | End: 2023-04-01 | Stop reason: HOSPADM

## 2023-03-31 RX ORDER — DEXAMETHASONE SODIUM PHOSPHATE 4 MG/ML
INJECTION, SOLUTION INTRA-ARTICULAR; INTRALESIONAL; INTRAMUSCULAR; INTRAVENOUS; SOFT TISSUE
Status: DISCONTINUED | OUTPATIENT
Start: 2023-03-31 | End: 2023-03-31

## 2023-03-31 RX ORDER — LIDOCAINE HYDROCHLORIDE 20 MG/ML
5 SOLUTION OROPHARYNGEAL
Status: COMPLETED | OUTPATIENT
Start: 2023-03-31 | End: 2023-03-31

## 2023-03-31 RX ORDER — PROPOFOL 10 MG/ML
VIAL (ML) INTRAVENOUS
Status: DISCONTINUED | OUTPATIENT
Start: 2023-03-31 | End: 2023-03-31

## 2023-03-31 RX ORDER — FENTANYL CITRATE 50 UG/ML
INJECTION, SOLUTION INTRAMUSCULAR; INTRAVENOUS
Status: DISCONTINUED | OUTPATIENT
Start: 2023-03-31 | End: 2023-03-31

## 2023-03-31 RX ORDER — SUCCINYLCHOLINE CHLORIDE 20 MG/ML
INJECTION INTRAMUSCULAR; INTRAVENOUS
Status: DISCONTINUED | OUTPATIENT
Start: 2023-03-31 | End: 2023-03-31

## 2023-03-31 RX ORDER — SUCRALFATE 1 G/1
1 TABLET ORAL
Status: DISCONTINUED | OUTPATIENT
Start: 2023-03-31 | End: 2023-04-01 | Stop reason: HOSPADM

## 2023-03-31 RX ORDER — LABETALOL HYDROCHLORIDE 5 MG/ML
20 INJECTION, SOLUTION INTRAVENOUS
Status: COMPLETED | OUTPATIENT
Start: 2023-03-31 | End: 2023-03-31

## 2023-03-31 RX ORDER — NALOXONE HCL 0.4 MG/ML
0.02 VIAL (ML) INJECTION
Status: DISCONTINUED | OUTPATIENT
Start: 2023-03-31 | End: 2023-04-01 | Stop reason: HOSPADM

## 2023-03-31 RX ORDER — ONDANSETRON 2 MG/ML
4 INJECTION INTRAMUSCULAR; INTRAVENOUS
Status: COMPLETED | OUTPATIENT
Start: 2023-03-31 | End: 2023-03-31

## 2023-03-31 RX ORDER — GLYCOPYRROLATE 0.2 MG/ML
INJECTION INTRAMUSCULAR; INTRAVENOUS
Status: DISCONTINUED | OUTPATIENT
Start: 2023-03-31 | End: 2023-03-31

## 2023-03-31 RX ORDER — HYDROMORPHONE HYDROCHLORIDE 2 MG/ML
0.2 INJECTION, SOLUTION INTRAMUSCULAR; INTRAVENOUS; SUBCUTANEOUS EVERY 5 MIN PRN
Status: CANCELLED | OUTPATIENT
Start: 2023-03-31

## 2023-03-31 RX ADMIN — ROCURONIUM BROMIDE 5 MG: 10 SOLUTION INTRAVENOUS at 05:03

## 2023-03-31 RX ADMIN — LIDOCAINE HYDROCHLORIDE 5 ML: 20 SOLUTION ORAL; TOPICAL at 05:03

## 2023-03-31 RX ADMIN — LIDOCAINE HYDROCHLORIDE 80 ML: 20 INJECTION, SOLUTION EPIDURAL; INFILTRATION; INTRACAUDAL; PERINEURAL at 05:03

## 2023-03-31 RX ADMIN — ACETAMINOPHEN 325MG 650 MG: 325 TABLET ORAL at 09:03

## 2023-03-31 RX ADMIN — IOPAMIDOL 100 ML: 755 INJECTION, SOLUTION INTRAVENOUS at 11:03

## 2023-03-31 RX ADMIN — LABETALOL HYDROCHLORIDE 20 MG: 5 INJECTION, SOLUTION INTRAVENOUS at 10:03

## 2023-03-31 RX ADMIN — PROPOFOL 200 MG: 10 INJECTION, EMULSION INTRAVENOUS at 05:03

## 2023-03-31 RX ADMIN — SUCRALFATE 1 G: 1 TABLET ORAL at 12:03

## 2023-03-31 RX ADMIN — MORPHINE SULFATE 4 MG: 4 INJECTION INTRAVENOUS at 10:03

## 2023-03-31 RX ADMIN — GLYCOPYRROLATE 0.2 MG: 0.2 INJECTION INTRAMUSCULAR; INTRAVENOUS at 05:03

## 2023-03-31 RX ADMIN — MIDAZOLAM HYDROCHLORIDE 2 MG: 1 INJECTION, SOLUTION INTRAMUSCULAR; INTRAVENOUS at 05:03

## 2023-03-31 RX ADMIN — METHYLPREDNISOLONE SODIUM SUCCINATE 125 MG: 125 INJECTION, POWDER, FOR SOLUTION INTRAMUSCULAR; INTRAVENOUS at 10:03

## 2023-03-31 RX ADMIN — FENTANYL CITRATE 100 MCG: 50 INJECTION, SOLUTION INTRAMUSCULAR; INTRAVENOUS at 05:03

## 2023-03-31 RX ADMIN — DIPHENHYDRAMINE HYDROCHLORIDE 50 MG: 50 INJECTION INTRAMUSCULAR; INTRAVENOUS at 10:03

## 2023-03-31 RX ADMIN — SODIUM CHLORIDE, SODIUM GLUCONATE, SODIUM ACETATE, POTASSIUM CHLORIDE AND MAGNESIUM CHLORIDE: 526; 502; 368; 37; 30 INJECTION, SOLUTION INTRAVENOUS at 05:03

## 2023-03-31 RX ADMIN — DEXAMETHASONE SODIUM PHOSPHATE 4 MG: 4 INJECTION, SOLUTION INTRA-ARTICULAR; INTRALESIONAL; INTRAMUSCULAR; INTRAVENOUS; SOFT TISSUE at 06:03

## 2023-03-31 RX ADMIN — HYOSCYAMINE SULFATE 0.12 MG: 0.12 TABLET ORAL at 05:03

## 2023-03-31 RX ADMIN — SUCCINYLCHOLINE CHLORIDE 140 MG: 20 INJECTION, SOLUTION INTRAMUSCULAR; INTRAVENOUS at 05:03

## 2023-03-31 RX ADMIN — SUCRALFATE 1 G: 1 TABLET ORAL at 08:03

## 2023-03-31 RX ADMIN — ONDANSETRON 4 MG: 2 INJECTION INTRAMUSCULAR; INTRAVENOUS at 10:03

## 2023-03-31 RX ADMIN — ALUMINUM HYDROXIDE, MAGNESIUM HYDROXIDE, AND SIMETHICONE 5 ML: 200; 200; 20 SUSPENSION ORAL at 05:03

## 2023-03-31 RX ADMIN — ONDANSETRON 8 MG: 2 INJECTION INTRAMUSCULAR; INTRAVENOUS at 06:03

## 2023-03-31 NOTE — ANESTHESIA POSTPROCEDURE EVALUATION
Anesthesia Post Evaluation    Patient: Sam Horton    Procedure(s) Performed: Procedure(s) (LRB):  EGD (ESOPHAGOGASTRODUODENOSCOPY) (N/A)    Final Anesthesia Type: general      Patient location during evaluation: PACU  Patient participation: Yes- Able to Participate  Level of consciousness: awake and alert  Post-procedure vital signs: reviewed and stable  Pain management: adequate  Airway patency: patent  ELLIS mitigation strategies: Multimodal analgesia  PONV status at discharge: No PONV  Anesthetic complications: no      Cardiovascular status: blood pressure returned to baseline and hemodynamically stable  Respiratory status: unassisted  Hydration status: euvolemic  Follow-up not needed.          Vitals Value Taken Time   /81 03/31/23 1838   Temp 36.6 °C (97.9 °F) 03/31/23 1825   Pulse 99 03/31/23 1838   Resp 20 03/31/23 1838   SpO2 92 % 03/31/23 1838         Event Time   Out of Recovery 18:24:45         Pain/Ellie Score: Pain Rating Prior to Med Admin: 8 (3/31/2023 10:30 AM)  Ellie Score: 10 (3/31/2023  6:38 PM)

## 2023-03-31 NOTE — TRANSFER OF CARE
"Anesthesia Transfer of Care Note    Patient: Sam Horton    Procedure(s) Performed: Procedure(s) (LRB):  EGD (ESOPHAGOGASTRODUODENOSCOPY) (N/A)    Patient location: PACU    Anesthesia Type: general    Transport from OR: Transported from OR on 2-3 L/min O2 by NC with adequate spontaneous ventilation    Post pain: adequate analgesia    Post assessment: no apparent anesthetic complications and tolerated procedure well    Post vital signs: stable    Level of consciousness: awake and responds to stimulation    Nausea/Vomiting: no nausea/vomiting    Complications: none    Transfer of care protocol was followed      Last vitals:   Visit Vitals  BP (!) 147/97 (BP Location: Left arm, Patient Position: Lying)   Pulse 93   Temp 36.7 °C (98.1 °F) (Temporal)   Resp 20   Ht 5' 7" (1.702 m)   Wt 122.5 kg (270 lb)   SpO2 (!) 92%   BMI 42.29 kg/m²     "

## 2023-03-31 NOTE — ED PROVIDER NOTES
"Encounter Date: 3/31/2023       History     Chief Complaint   Patient presents with    Chest Pain     Pt. C/o throat/chest pain started a few days ago after eating.. pain exacerbated with swallowing.. reports seeing ED and was told if it got worse he would need and endoscopy.. reports still able to swallow liquids and solids// reports hx of htn but not taking medications      42-year-old male presents to the emergency department for evaluation of throat/chest pain for the last several days.  Reports that he had an episode of vomiting after eating and then pain began.  States it has progressively worsened since time.  Difficulty with swallowing however able to keep both solids and liquids down but hurts significantly worse with trying to pass solids.  Denies any fever or chills.  He was seen at another hospital earlier this morning prescribe symptomatic treatments and advised to follow up with a gastroenterologist or present to this hospital if symptoms worsen as we have on-call gastroenterology services.    The history is provided by the patient.   Chest Pain  The current episode started several days ago. Chest pain occurs constantly. The chest pain is unchanged. The pain is associated with eating. At its most intense, the chest pain is at 10/10. The chest pain is currently at 10/10. The quality of the pain is described as stabbing. Chest pain is worsened by eating. Primary symptoms include shortness of breath, nausea and vomiting. Pertinent negatives for primary symptoms include no fever.   Review of patient's allergies indicates:   Allergen Reactions    Iodine Swelling and Rash    Aspirin Hives     Other reaction(s): NAUSEA,UPSET STOMACH    Shellfish containing products Hives    Gluten protein Nausea And Vomiting     "Patient states it is severe N/V"     Latex, natural rubber Rash    Tree nut Nausea And Vomiting     Past Medical History:   Diagnosis Date    Cancer     Essential (primary) hypertension     Obesity, " unspecified     Testicular cancer      Past Surgical History:   Procedure Laterality Date    CHOLECYSTECTOMY      ESOPHAGOGASTRODUODENOSCOPY N/A 3/31/2023    Procedure: EGD (ESOPHAGOGASTRODUODENOSCOPY);  Surgeon: Ramon Mcguire MD;  Location: [facility];  Service: Gastroenterology;  Laterality: N/A;  For 5.00 pm today     Family History   Problem Relation Age of Onset    No Known Problems Mother     Heart disease Father      Social History     Tobacco Use    Smoking status: Never    Smokeless tobacco: Never   Substance Use Topics    Alcohol use: No    Drug use: No     Review of Systems   Constitutional:  Negative for fever.   Respiratory:  Positive for chest tightness and shortness of breath.    Cardiovascular:  Positive for chest pain.   Gastrointestinal:  Positive for nausea and vomiting. Negative for diarrhea.     Physical Exam     Initial Vitals [03/31/23 0935]   BP Pulse Resp Temp SpO2   (!) 210/102 83 20 98.1 °F (36.7 °C) 96 %      MAP       --         Physical Exam    Nursing note and vitals reviewed.  Constitutional: He appears well-developed and well-nourished. No distress.   HENT:   Head: Normocephalic and atraumatic.   Mouth/Throat: Oropharynx is clear and moist.   Eyes: Conjunctivae are normal. Pupils are equal, round, and reactive to light.   Neck: Neck supple. No tracheal deviation present.   No palpable crepitus   Normal range of motion.  Cardiovascular:  Normal rate, regular rhythm and normal heart sounds.           Pulmonary/Chest: Breath sounds normal. No respiratory distress.   Abdominal: Abdomen is soft. Bowel sounds are normal. He exhibits no distension. There is no abdominal tenderness.   Musculoskeletal:      Cervical back: Normal range of motion and neck supple.     Neurological: He is alert and oriented to person, place, and time. GCS score is 15. GCS eye subscore is 4. GCS verbal subscore is 5. GCS motor subscore is 6.   Skin: Skin is warm and dry.       ED Course   Procedures  Labs Reviewed    TROPONIN I - Normal     EKG Readings: (Independently Interpreted)   Initial Reading: No STEMI. Rhythm: Sinus Arrhythmia. Heart Rate: 85. Clinical Impression: Left Ventricular Hypertrophy (LDH)   03/31/2023 @ 0937     ECG Results              EKG 12-lead (Final result)  Result time 03/31/23 12:12:32      Final result by Interface, Lab In [facility] (03/31/23 12:12:32)                   Narrative:    Test Reason : R07.9,    Vent. Rate : 085 BPM     Atrial Rate : 085 BPM     P-R Int : 138 ms          QRS Dur : 100 ms      QT Int : 374 ms       P-R-T Axes : 027 -04 018 degrees     QTc Int : 445 ms    Sinus rhythm with marked sinus arrythmia  Minimal voltage criteria for LVH, may be normal variant ( R in aVL )  Borderline Abnormal ECG  When compared with ECG of 31-MAR-2023 05:23,  No significant change was found  Confirmed by Mikie Arciniega MD ([id number]) on 3/31/2023 12:12:21 PM    Referred By:             Confirmed By:Mikie Arciniega MD                                     EKG 12-LEAD (Final result)  Result time 04/07/23 09:20:37      Final result by Unknown User (04/07/23 09:20:37)                                      Imaging Results              CTA Chest Aorta Non Coronary (Final result)  Result time 03/31/23 12:12:06      Final result by Renny Martines MD (03/31/23 12:12:06)                   Impression:      No thoracic aortic aneurysm or dissection identified.      Electronically signed by: Renny Martines  Date:    03/31/2023  Time:    12:12               Narrative:    EXAMINATION:  CTA CHEST AORTA NON CORONARY    CLINICAL HISTORY:  Aortic dissection suspected;severe chest pain, uncontrolled HTN (/134);    TECHNIQUE:  CTA imaging of the chest before and after IV contrast.  Axial, coronal and sagittal reformatted images reviewed.  3-D reconstructed and MIP images also reviewed for further assessment of the vasculature.   Dose length product is 1554 mGycm. Automatic exposure control, adjustment of mA/kV or iterative  reconstruction technique used to limit radiation dose.    COMPARISON:  CT abdomen/pelvis 04/27/2016    FINDINGS:  Mild artifact along the ascending aorta related to cardiac motion.  No thoracic aortic aneurysm or convincing findings for thoracic aortic dissection.  Heart size within normal limits.  No pleural or pericardial effusion.    No pathologically enlarged thoracic lymph node.  No consolidation or suspicious pulmonary nodule.  Mild to moderate hepatic steatosis.  Prior cholecystectomy.  No acute osseous findings.                                       X-Ray Chest PA And Lateral (Final result)  Result time 03/31/23 10:23:18      Final result by Celestino Zhao MD (03/31/23 10:23:18)                   Impression:      No acute cardiopulmonary process.      Electronically signed by: Celestino Zhao  Date:    03/31/2023  Time:    10:23               Narrative:    EXAMINATION:  XR CHEST PA AND LATERAL    CLINICAL HISTORY:  Chest pain, unspecified    TECHNIQUE:  Two views of the chest    COMPARISON:  No prior imaging available for comparison.    FINDINGS:  No focal opacification, pleural effusion, or pneumothorax.    The cardiomediastinal silhouette is within normal limits.    No acute osseous abnormality.                                       Medications   diphenhydrAMINE injection 50 mg (50 mg Intravenous Given 3/31/23 1030)   methylPREDNISolone sodium succinate injection 125 mg (125 mg Intravenous Given 3/31/23 1030)   morphine injection 4 mg (4 mg Intravenous Given 3/31/23 1030)   ondansetron injection 4 mg (4 mg Intravenous Given 3/31/23 1030)   labetaloL injection 20 mg (20 mg Intravenous Given 3/31/23 1030)   iopamidoL (ISOVUE-370) injection 100 mL (100 mLs Intravenous Given 3/31/23 1156)   sucralfate tablet 1 g (1 g Oral Given 3/31/23 1242)   LIDOcaine HCl 2% (XYLOCAINE) 2 % oral solution (  Given 4/1/23 0430)   aluminum-magnesium hydroxide-simethicone (MAALOX) 200-200-20 mg/5 mL suspension (30 mLs Oral  Given 4/1/23 8680)   Medical Decision Making  Problems Addressed:  Acute chest pain: acute illness or injury with systemic symptoms that poses a threat to life or bodily functions  Dysphagia, unspecified type: complicated acute illness or injury  Hypertension, unspecified type: chronic illness or injury with exacerbation, progression, or side effects of treatment      ED assessment:    Mr. Horton presented for evaluation of severe chest pain after vomiting episode a few days ago, difficulty tolerating oral intake due to pain.  Seen her Hospital earlier today with reassuring EKG and laboratory studies; however, symptoms unrelieved medications administered they are advised to come here for GI evaluation.  Markedly hypertensive in the setting of not tolerating his oral medications    Differential diagnosis (including but not limited to):   Partial esophageal occlusion, Boerhaave syndrome, esophagitis, peptic ulcer disease, GERD, acute coronary syndrome symptomatic hypertension/hypertensive urgency, aortic dissection, aortic aneurysm, mediastinitis    ED management:   As below, considered potential aortic pathology; however, thankfully CT scan with no acute dissection, aneurysmal dilatation or other vascular findings.  No cardiopulmonary process noted, specifically no pneumomediastinum.  Additionally, no noted esophageal dilatation to suggest obstructive process.  Laboratory studies prior visit earlier this morning reviewed as well as 2nd troponin drawn now with no clinically significant acute findings, no indication of an ischemic process.  Despite attempts at symptomatic treatment in the emergency department, his pain remains uncontrolled.  Additionally, he was markedly hypertensive as not tolerating home BP meds.  Discussed with GI and Hospital Medicine and will admit for observation, consideration of EGD.    My independent radiology interpretation:   Chest x-ray:  Normal cardiomediastinal silhouette, no  pneumomediastinum, no focal infiltrate or effusion      Amount and/or Complexity of Data Reviewed  Independent historian: none   Summary of history:   External data reviewed: notes from previous ED visits, prior labs, and prior EKGs  Summary of data reviewed:  Workup from the ED visit earlier today reviewed, no indication of acute process on their EKG or laboratory studies  Risk and benefits of testing: discussed   Labs: ordered and reviewed  Radiology: ordered and independent interpretation performed (see above or ED course)  ECG/medicine tests: ordered and independent interpretation performed (see above or ED course)  Discussion of management or test interpretation with external provider(s): discussed with hospitalist physician and discussed with Gastroenterology consultant   Summary of discussion:  As below, agrees with admission, plan for likely EGD    Risk  Parenteral controlled substances   Decision regarding hospitalization  Shared decision making     Critical Care  none    I, Wanda Cuello MD personally performed the history, PE, MDM, and procedures as documented above and agree with the scribe's documentation.                 ED Course as of 04/24/23 0516   Fri Mar 31, 2023   1016 BP markedly elevated.  History of hypertension but lost to follow up, not on any antihypertensives for the last several years.  Symmetric pulses on examination, no focal neuro deficits; however, given workup at the previous facility with no clinically significant findings, concern for potential acute intrathoracic pathology including aortic dissection.  Alternatively, with vomiting on Wednesday and now persistent/worsening dysphagia, consider Boerhaave syndrome as well.  Will obtain CT scan of the chest with IV contrast to evaluate further. [KS]   9227 Discussed with GI on-call, who agrees to follow in consultation, consider potential EGD.  Patient to be admitted to hospital medicine services under observation for further symptom  control and inpatient evaluation [KS]      ED Course User Index  [KS] Wanda Cuello MD                   Clinical Impression:   Final diagnoses:  [R07.9] Chest pain  [R07.9] Acute chest pain  [R13.10] Dysphagia, unspecified type (Primary)  [I10] Hypertension, unspecified type        ED Disposition Condition    Observation Stable                Wanda Cuello MD  04/24/23 0545

## 2023-03-31 NOTE — PROCEDURES
Sam Horton   MRN: [id number]   ADMISSION DATE: 3/31/2023  : 1980  AGE: 42 y.o.    DATE OF PROCEDURE:  2023     PROCEDURE:  EGD, diagnostic    SURGEON: MELVIN RINCON    PREOPERATIVE DIAGNOSIS:  Dysphagia, food bolus sensation (distal esophagus), vomiting     POSTOPERATIVE DIAGNOSIS:  1.  Distal esophageal ulceration approximately 3 cm.   2. 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  Not biopsied.  3. Small hiatal hernia.  Otherwise normal exam.    HISTORY AND PHYSICAL:  As per preoperative note.      DESCRIPTION OF PROCEDURE:  Informed consent was obtained.  Patient was placed in left lateral position.  Sedation per anesthesia service.  Olympus video gastroscope was introduced into the oral cavity and esophagus was intubated under direct visualization. The scope was carefully advanced to the distal duodenum.  Patient tolerated the procedure well without any complications.    FINDINGS:  Esophagus:  Patient was noted to have linear ulceration which was approximately 3 cm in length and 1 cm in width.  There was whitish material on the surface including probable Carafate slurry.  No evidence of bleeding.  There were islands of salmon-colored mucosa above GE junction suggestive of Flores's esophagus.  No biopsies were obtained.  There was a small hiatal hernia.  GE junction was at approximately 37 cm.  Stomach: Fundus, cardia, body and antrum appeared unremarkable.    Duodenum:  Duodenum bulb, 2nd and 3rd portion of the duodenum appeared unremarkable to the extent of exam.    ESTIMATED BLOOD LOSS:  None    COMPLICATIONS:.  None.    RECOMMENDATIONS:  1. Clear liquids sparingly as tolerated.  2. IV ppi.  3. Carafate suspension 1 g a.c. and HS starting tomorrow.  4. Patient will need follow up with his primary gastroenterologist as outpatient.  He has seen Dr. Hendricks 10 years ago.  He would need follow up with the [facility]  Gastroenterology on discharge.    5. Patient will need repeat endoscopy in 2-3 months to document  healing of above ulcerative esophagitis and to evaluate for probable underlying Flores's esophagus.

## 2023-03-31 NOTE — FIRST PROVIDER EVALUATION
"Medical screening examination initiated.  I have conducted a focused provider triage encounter, findings are as follows:    Brief history of present illness:  Patient states chest pain and esophogeal pain x a few days. States difficulty swallowing. States "indigestion." States that he was seen at [facility] this morning.     There were no vitals filed for this visit.    Pertinent physical exam:  Awake, alert, ambulatory      Brief workup plan:  Labs, EKG    Preliminary workup initiated; this workup will be continued and followed by the physician or advanced practice provider that is assigned to the patient when roomed.  "

## 2023-03-31 NOTE — ANESTHESIA PROCEDURE NOTES
Intubation    Date/Time: 3/31/2023 5:52 PM  Performed by: Nick Hewitt CRNA  Authorized by: Gennaro Dominguez MD     Intubation:     Induction:  Intravenous    Intubated:  Postinduction    Mask Ventilation:  Easy mask    Attempts:  1    Attempted By:  CRNA    Method of Intubation:  Direct    Blade:  Gabrielle 3    Laryngeal View Grade: Grade I - full view of cords      Difficult Airway Encountered?: No      Complications:  None    Airway Device:  Oral endotracheal tube    Airway Device Size:  7.5    Style/Cuff Inflation:  Cuffed    Inflation Amount (mL):  5    Tube secured:  22    Secured at:  The lips    Placement Verified By:  Capnometry and Colorimetric ETCO2 device    Complicating Factors:  None    Findings Post-Intubation:  BS equal bilateral and atraumatic/condition of teeth unchanged

## 2023-03-31 NOTE — CONSULTS
Sam Horton   MRN: [id number]   ADMISSION DATE: 3/31/2023  : 1980  AGE: 42 y.o.    DATE :  2023     PROVIDER: MELVIN RINCON    REASON FOR REFERRAL   42 years old male  with a history of hypertension, noncompliant with the medication,  history of testicular cancer (in remission per patient) presented with Chest Pain (Pt. C/o throat/chest pain started a few days ago after eating.. pain exacerbated with swallowing.. reports seeing ED and was told if it got worse he would need upper endoscopy.. Reports still able to swallow liquids and solids/ reports hx of htn but not taking medications )     Patient saw Dr. Hendricks approximately 10 years ago.  He had upper endoscopy done at that time.  There was a clinical concern for gluten sensitivity.  He was advised to avoid gluten containing diet.  There is no documented history of celiac disease reported.    He reports a colonoscopy done before he had upper endoscopy. No previous  endoscopy reports are available at this time. Patient reports intermittent lower abdominal discomfort.  He also reports some epigastric discomfort with 1 episode of vomiting couple of days ago.  He reports complains of dysphagia in the mid to lower esophagus and has a sensation of something stuck in the lower esophagus.  He was able to drink water in emergency room with no difficulty.  Also was able to take Carafate.    Patient information was obtained from patient, patient's family, past medical records and ER records.     SUBJECTIVE:    Review of Systems   No fever or chills.  No cardiopulmonary symptoms at this time.  History of intermittent lower abdominal discomfort.  History of epigastric discomfort.   He does complain of reflux and heartburn.  One episode of vomiting.  No nausea.  No overt GI blood loss.  History of odynophagia as well as dysphagia as mentioned above.  Seems to   be confined to lower and mid esophagus.  He was able to drink liquids.  Feels like food stuck in the  lower esophagus.    Review of patient's allergies indicates:   Allergen Reactions    Aspirin Hives     Other reaction(s): NAUSEA,UPSET STOMACH    Shellfish containing products Hives    Latex, natural rubber Rash    Peanut (legumes) Nausea And Vomiting    Tree nut Nausea And Vomiting         enoxaparin  40 mg Subcutaneous Daily       There are no discontinued medications.         Past Medical History:   Diagnosis Date    Cancer     Essential (primary) hypertension       Social History     Socioeconomic History    Marital status:    Tobacco Use    Smoking status: Never    Smokeless tobacco: Never   Substance and Sexual Activity    Alcohol use: No    Drug use: No    Sexual activity: Not Currently      Past Surgical History:   Procedure Laterality Date    CHOLECYSTECTOMY          Family History   Problem Relation Age of Onset    No Known Problems Mother     Heart disease Father           OBJECTIVE:     Vitals:    03/31/23 1030 03/31/23 1105 03/31/23 1203 03/31/23 1251   BP: (!) 180/120 (!) 156/103 (!) 147/89 (!) 130/91   Pulse:  86 79 90   Resp: 18 (!) 21 18 18   Temp:       SpO2:  (!) 94% (!) 94% (!) 94%   Weight:       Height:         Physical Exam   HEENT examination:  Unremarkable   Neck:  Supple   Chest:  Decreased breath sounds bilaterally   Heart examination:  S1, S2 audible   Abdomen:  Bowel sounds positive, soft.  Obese.  Limited exam due to body habitus.    Extremities:  With some edema bilaterally.    LABS    Recent Labs   Lab 03/31/23  0546   WBC 11.4   HGB 14.4   HCT 43.5         Recent Labs   Lab 03/31/23  0546      K 3.6   CO2 27   BUN 10.8   CREATININE 0.83   CALCIUM 10.1   BILITOT 0.4   ALKPHOS 67   ALT 68*   AST 25   GLUCOSE 113*    No results for input(s): INR in the last 168 hours. No results for input(s): AMYLASE in the last 168 hours. No results for input(s): APTT, INR, PTT in the last 168 hours.        RESULTS: X-Ray Chest PA And Lateral    Result Date: 3/31/2023  EXAMINATION:  XR CHEST PA AND LATERAL CLINICAL HISTORY: Chest pain, unspecified TECHNIQUE: Two views of the chest COMPARISON: No prior imaging available for comparison. FINDINGS: No focal opacification, pleural effusion, or pneumothorax. The cardiomediastinal silhouette is within normal limits. No acute osseous abnormality.     No acute cardiopulmonary process. Electronically signed by: Celestino Zhao Date:    03/31/2023 Time:    10:23    CTA Chest Aorta Non Coronary    Result Date: 3/31/2023  EXAMINATION: CTA CHEST AORTA NON CORONARY CLINICAL HISTORY: Aortic dissection suspected;severe chest pain, uncontrolled HTN (/134); TECHNIQUE: CTA imaging of the chest before and after IV contrast.  Axial, coronal and sagittal reformatted images reviewed.  3-D reconstructed and MIP images also reviewed for further assessment of the vasculature.   Dose length product is 1554 mGycm. Automatic exposure control, adjustment of mA/kV or iterative reconstruction technique used to limit radiation dose. COMPARISON: CT abdomen/pelvis 04/27/2016 FINDINGS: Mild artifact along the ascending aorta related to cardiac motion.  No thoracic aortic aneurysm or convincing findings for thoracic aortic dissection.  Heart size within normal limits.  No pleural or pericardial effusion. No pathologically enlarged thoracic lymph node.  No consolidation or suspicious pulmonary nodule.  Mild to moderate hepatic steatosis.  Prior cholecystectomy.  No acute osseous findings.     No thoracic aortic aneurysm or dissection identified. Electronically signed by: Renny Martines Date:    03/31/2023 Time:    12:12             ICD-10-CM ICD-9-CM   1. Dysphagia, unspecified type  R13.10 787.20   2. Chest pain  R07.9 786.50   3. Acute chest pain  R07.9 786.50        ASSESSMENT & PLAN:   42 years old male  with a history of hypertension, noncompliant with the medication,  history of testicular cancer (in remission per patient) presented with Chest Pain (Pt. C/o throat/chest  pain started a few days ago after eating.. pain exacerbated with swallowing.. reports seeing ED and was told if it got worse he would need and endoscopy.. reports still able to swallow liquids and solids// reports hx of htn but not taking medications )     Patient saw Dr. Hendricks approximately 10 years ago.  He had upper endoscopy done at that time.  There was clinical concern for gluten sensitivity.  He was advised to avoid gluten containing diet.  There is no documented history of celiac disease reported.    He reports a colonoscopy done before he had upper endoscopy. No previous   Endoscopy records are available at this time. Patient reports intermittent lower abdominal discomfort.  He also reports some epigastric discomfort with 1 episode of vomiting couple of days ago.  He reports complains of dysphagia in the mid to lower esophagus and has a sensation of something stuck in the lower esophagus.  He was able to drink water in emergency room with no difficulty.  Also was able to take Carafate.    Patient information was obtained from patient, patient's family, past medical records and ER records.     Recommendations:    NPO for now.    2.  Differential diagnosis includes esophagitis, gastroesophageal reflux disease.  Need to rule out any esophageal obstruction.  I doubt this is complete obstruction as he was able to drink water with no drooling or regurgitation.  He still has a sensation of something stuck in the lower esophagus.  Pill esophagitis is another possibility.  Patient known to Dr. Hendricks  and was seen by him 10 years ago.  Plan for upper endoscopy today.  Probably would need PPI along with Carafate.  Additional recommendation pending EGD results.   Hold Lovenox for now.  He will need follow up with Dr. Hendricks's office on discharge.

## 2023-03-31 NOTE — H&P
[facility] Hospital Medicine History & Physical Examination       Patient Name: Sam Horton  MRN: [id number]  Patient Class: OP- Observation   Admission Date: 3/31/2023   Admitting Physician: XIMENA Service   Length of Stay: 0  Attending Physician: Dr. Tono Rush  Primary Care Provider: KRISTI Hess Jr, MD  Face-to-Face encounter date: 03/31/2023  Code Status: Full code  Chief Complaint: Chest Pain (Pt. C/o throat/chest pain started a few days ago after eating.. pain exacerbated with swallowing.. reports seeing ED and was told if it got worse he would need and endoscopy.. reports still able to swallow liquids and solids// reports hx of htn but not taking medications )        Patient information was obtained from patient, patient's family, past medical records and ER records.     HISTORY OF PRESENT ILLNESS:   Sam Horton is a 42 y.o. male who PMH includes HTN, HLD, prostate cancer, deafness; presents to the ED at [facility] on 3/31/2023 with a primary complaint of chest pain and sore throat which started a few days ago after eating chicken and rice.  Patient reports throat and chest pain worsens with swallowing liquids and solids.  He reports he was seen in the ED in [place] and subsequently discharged home. Pt reports his symptoms worsened and presented to the ED here for further evaluation. No reports of N/V/D fever, chills congestion, abdominal pain or any urinary symptoms. Labs demonstrated glucose 113, ALT 68, other indices unremarkable.  Initial VS /102 P 83 R 20 T 98.1F O2 saturation 96% on room air. Pt is admitted to hospital medicine services for further management. GI services consulted.     PAST MEDICAL HISTORY:   HTN  HLD   Prostate cancer   Deafness     PAST SURGICAL HISTORY:   Cystoscopy   Prostate biopsy   Cholecystectomy    ALLERGIES:   Aspirin; Shellfish containing products; Latex, natural rubber; Peanut (legumes); and Tree nut    FAMILY HISTORY:    Reviewed and negative    SOCIAL HISTORY:   Denies any tobacco use  Denies any illicit drug use  Denies any alcohol use   Lives with family     HOME MEDICATIONS:   As documented:   Pt is no longer taking any medication     REVIEW OF SYSTEMS:   Except as documented, all other systems reviewed and negative     PHYSICAL EXAM:     VITAL SIGNS: 24 HRS MIN & MAX LAST   Temp  Min: 98.1 °F (36.7 °C)  Max: 98.6 °F (37 °C) 98.1 °F (36.7 °C)   BP  Min: 130/91  Max: 216/134 (!) 130/91     Pulse  Min: 79  Max: 109  90   Resp  Min: 18  Max: 21 18   SpO2  Min: 94 %  Max: 97 % (!) 94 %         General appearance: Well-developed, well-nourished male, non-toxic, in no apparent distress.  HENT: Atraumatic head. Moist mucous membranes of oral cavity. Deafness noted, reads lips  Eyes: PERRL   Neck: Supple.   Lungs: Clear to auscultation bilaterally. No wheezing present.   Heart: Regular rate and rhythm. S1 and S2 present with no murmurs/gallop/rub. No pedal edema.   Abdomen: Soft, obese, non-distended, non-tender. No rebound tenderness/guarding. Bowel sounds are normal.   Extremities: MESSER, No cyanosis, clubbing  Skin: warm and dry  Neuro: AAOx3; no focal deficits  Psych/mental status: Appropriate mood and affect. Responds appropriately to questions.     LABS AND IMAGING:     Recent Labs   Lab 03/31/23  0546   WBC 11.4   RBC 5.08   HGB 14.4   HCT 43.5   MCV 85.6   MCH 28.3   MCHC 33.1   RDW 13.2      MPV 9.3       Recent Labs   Lab 03/31/23  0546      K 3.6   CO2 27   BUN 10.8   CREATININE 0.83   CALCIUM 10.1   ALBUMIN 4.0   ALKPHOS 67   ALT 68*   AST 25   BILITOT 0.4       Microbiology Results (last 7 days)       ** No results found for the last 168 hours. **             CTA Chest Aorta Non Coronary  Narrative: EXAMINATION:  CTA CHEST AORTA NON CORONARY    CLINICAL HISTORY:  Aortic dissection suspected;severe chest pain, uncontrolled HTN (/134);    TECHNIQUE:  CTA imaging of the chest before and after IV contrast.   Axial, coronal and sagittal reformatted images reviewed.  3-D reconstructed and MIP images also reviewed for further assessment of the vasculature.   Dose length product is 1554 mGycm. Automatic exposure control, adjustment of mA/kV or iterative reconstruction technique used to limit radiation dose.    COMPARISON:  CT abdomen/pelvis 04/27/2016    FINDINGS:  Mild artifact along the ascending aorta related to cardiac motion.  No thoracic aortic aneurysm or convincing findings for thoracic aortic dissection.  Heart size within normal limits.  No pleural or pericardial effusion.    No pathologically enlarged thoracic lymph node.  No consolidation or suspicious pulmonary nodule.  Mild to moderate hepatic steatosis.  Prior cholecystectomy.  No acute osseous findings.  Impression: No thoracic aortic aneurysm or dissection identified.    Electronically signed by: Renny Martines  Date:    03/31/2023  Time:    12:12  X-Ray Chest PA And Lateral  Narrative: EXAMINATION:  XR CHEST PA AND LATERAL    CLINICAL HISTORY:  Chest pain, unspecified    TECHNIQUE:  Two views of the chest    COMPARISON:  No prior imaging available for comparison.    FINDINGS:  No focal opacification, pleural effusion, or pneumothorax.    The cardiomediastinal silhouette is within normal limits.    No acute osseous abnormality.  Impression: No acute cardiopulmonary process.    Electronically signed by: Celestino Zhao  Date:    03/31/2023  Time:    10:23        ASSESSMENT & PLAN:   ASSESSMENT:  Acute chest pain- POA  Acute dysphagia- POA  Food bolus- questionable, imaging negative  HTN- essential, non complaint with home medication  HLD- non-compliant with home medication  Prostate cancer- s/p treatment  Deafness- chronic, with hearing aid use    PLAN:  Consult GI Services appreciate assistance and recommendations   Keep NPO status until evaluated by GI Services   PRN antihypertensive per parameters   Labs in the am  Fall precautions  PRN pain management  GI  plans for upper endoscopy today, keep NPO status    VTE Prophylaxis: Lovenox for DVT prophylaxis and will be advised to be as mobile as possible and sit in a chair as tolerated    Patient condition:  Stable  __________________________________________________________________________  INPATIENT LIST OF MEDICATIONS     Scheduled Meds:   enoxaparin  40 mg Subcutaneous Daily     Continuous Infusions:  PRN Meds:.dextrose 10%, dextrose 10%, glucagon (human recombinant), glucose, glucose, naloxone, ondansetron, sodium chloride 0.9%      I, BARBARA Coello have reviewed and discussed the case with Dr. Tono Rush. Please see the following addendum for further assessment and plan from there attending MD.  BARBARA Parsons   03/31/2023      ----------------------------    I Dr. Torey Rush performed substantive portion of the visit, personally performed a face to face evaluation of the patient and have reviewed and agree with NP/PA documentation, treatment and plan & MDM.         43 yo obese gentleman with HTN, testicular cancer s/p surgery ( in remission) presented with c/o sternal chest pain, difficlty swallowing since past two days. Alos reports episode of food getting stuck, followed by vomiting. Reports poor  PO intake, difficulty in swallowing solids and liquids since then. No similar events in the past. Denies any smoking. known diagnosis of GERD. But he does admit to having symptoms of GERD. Also ther was concern for celiac disease based on EGD done many years ago. At presentation he was in hypertensive urgency. CTA ruled out Aortic dissection. When seen in ER, he was comfortably resting. Wife was at bedside. Abdomen exam was benign. lower sternmal area was tender to palpation. Likely esophagitis, chronic GERD changes. GI planning for EGD today. Will continue PPI and carafate. Also review other home meds after reconciliation               X Size Of Lesion In Cm (Optional): 0 Hemostasis: None Anesthesia Volume In Cc (Will Not Render If 0): 0.5 Validate Anticipated Plan: No Billing Type: Third-Party Bill Biopsy Type: H and E Validate Note Data (See Information Below): Yes Punch Size In Mm: 3 Epidermal Sutures: 3-0 Ethilon Detail Level: Detailed Information: Selecting Yes will display possible errors in your note based on the variables you have selected. This validation is only offered as a suggestion for you. PLEASE NOTE THAT THE VALIDATION TEXT WILL BE REMOVED WHEN YOU FINALIZE YOUR NOTE. IF YOU WANT TO FAX A PRELIMINARY NOTE YOU WILL NEED TO TOGGLE THIS TO 'NO' IF YOU DO NOT WANT IT IN YOUR FAXED NOTE. Dressing: bandage Suture Removal: 14 days Anesthesia Type: 1% lidocaine with epinephrine Wound Care: Petrolatum

## 2023-03-31 NOTE — ANESTHESIA PREPROCEDURE EVALUATION
03/31/2023  Sam Horton is a 42 y.o., male.      Pre-op Assessment    I have reviewed the Patient Summary Reports.     I have reviewed the Nursing Notes. I have reviewed the NPO Status.   I have reviewed the Medications.     Review of Systems  Anesthesia Hx:  No problems with previous Anesthesia    Hematology/Oncology:  Hematology Normal      Current/Recent Cancer.   EENT/Dental:  EENT/Dental Normal Deaf/ but reads lips   Cardiovascular:   Hypertension    Pulmonary:  Pulmonary Normal    Renal/:  Renal/ Normal     Hepatic/GI:   GERD Pain in lower esopageal area. Able to swallow liquids   Musculoskeletal:  Musculoskeletal Normal    Neurological:  Neurology Normal    Endocrine:  Endocrine Normal  Morbid Obesity / BMI > 40  Dermatological:  Skin Normal    Psych:  Psychiatric Normal           Physical Exam  General: Well nourished, Cooperative, Alert and Oriented    Airway:  Mallampati: II   Mouth Opening: Normal  TM Distance: Normal  Tongue: Large  Neck ROM: Normal ROM    Dental:  Intact    Chest/Lungs:  Clear to auscultation, Normal Respiratory Rate    Heart:  Rate: Normal  Rhythm: Regular Rhythm        Anesthesia Plan  Type of Anesthesia, risks & benefits discussed:    Anesthesia Type: Gen ETT  Intra-op Monitoring Plan: Standard ASA Monitors  Post Op Pain Control Plan: multimodal analgesia  Induction:  IV  Airway Plan: Direct  Informed Consent: Informed consent signed with the Patient and all parties understand the risks and agree with anesthesia plan.  All questions answered.   ASA Score: 2  Day of Surgery Review of History & Physical: I have interviewed and examined the patient. I have reviewed the patient's H&P dated:     Ready For Surgery From Anesthesia Perspective.     .

## 2023-03-31 NOTE — ED PROVIDER NOTES
Encounter Date: 3/31/2023       History     Chief Complaint   Patient presents with    Gastroesophageal Reflux     GERD/ chest [ain since Wednesday. Intermittent, feels like food is stuck in his throat. Denies history of food bolus     Over the past 2 days patient has had difficulty swallowing mostly solids and felt like he had something stuck in his esophagus; also reports heartburn; prior to this he denies having issues with heartburn; no nausea or vomiting; patient notes some epigastric discomfort as well; history of cholecystectomy    The history is provided by the patient.   Review of patient's allergies indicates:   Allergen Reactions    Aspirin Hives     Other reaction(s): NAUSEA,UPSET STOMACH    Shellfish containing products Hives     No past medical history on file.  Past Surgical History:   Procedure Laterality Date    CHOLECYSTECTOMY       Family History   Problem Relation Age of Onset    No Known Problems Mother     Heart disease Father      Social History     Tobacco Use    Smoking status: Never    Smokeless tobacco: Never   Substance Use Topics    Alcohol use: No    Drug use: No     Review of Systems   Constitutional: Negative.    HENT: Negative.     Respiratory: Negative.     Cardiovascular: Negative.    Gastrointestinal: Negative.    Genitourinary: Negative.    Musculoskeletal: Negative.    Neurological: Negative.      Physical Exam     Initial Vitals [03/31/23 0514]   BP Pulse Resp Temp SpO2   (!) 155/115 109 18 98.6 °F (37 °C) 96 %      MAP       --         Physical Exam    Nursing note and vitals reviewed.  Constitutional: He appears well-developed and well-nourished.   Obese   HENT:   Head: Normocephalic and atraumatic.   Eyes: EOM are normal. Pupils are equal, round, and reactive to light.   Neck: Neck supple.   Normal range of motion.  Cardiovascular:  Normal rate, regular rhythm and normal heart sounds.           Pulmonary/Chest: Breath sounds normal.   Abdominal: Abdomen is soft. Bowel sounds  are normal. There is abdominal tenderness (Mild, epigastric).   Protuberant There is no rebound and no guarding.   Musculoskeletal:         General: Normal range of motion.      Cervical back: Normal range of motion and neck supple.     Neurological: He is alert and oriented to person, place, and time.   Skin: Skin is warm and dry.       ED Course   Procedures  Labs Reviewed   COMPREHENSIVE METABOLIC PANEL - Abnormal; Notable for the following components:       Result Value    Glucose Level 113 (*)     Alanine Aminotransferase 68 (*)     All other components within normal limits   CBC WITH DIFFERENTIAL - Abnormal; Notable for the following components:    IG# 0.05 (*)     All other components within normal limits   LIPASE - Normal   CBC W/ AUTO DIFFERENTIAL    Narrative:     The following orders were created for panel order CBC auto differential.  Procedure                               Abnormality         Status                     ---------                               -----------         ------                     CBC with Differential[[id number]]        Abnormal            Final result                 Please view results for these tests on the individual orders.   TROPONIN ISTAT   POCT TROPONIN     EKG Readings: (Independently Interpreted)   Rhythm: Sinus Tachycardia. Heart Rate: 102. Ectopy: No Ectopy. ST Segments: Normal ST Segments.   Voltage criteria for LVH; inferior infarct age undetermined   ECG Results              EKG 12-lead (In process)  Result time 03/31/23 05:49:51      In process by Interface, Lab In [facility] (03/31/23 05:49:51)                   Narrative:    Test Reason : R07.9,    Vent. Rate : 102 BPM     Atrial Rate : 102 BPM     P-R Int : 150 ms          QRS Dur : 098 ms      QT Int : 348 ms       P-R-T Axes : 032 -09 025 degrees     QTc Int : 453 ms    Sinus tachycardia  Voltage criteria for left ventricular hypertrophy  Inferior infarct ,age undetermined  Abnormal ECG  No previous ECGs  available    Referred By:             Confirmed By:                                   Imaging Results    None          Medications   hyoscyamine ODT 0.125 mg (0.125 mg Oral Given 3/31/23 0538)   aluminum-magnesium hydroxide-simethicone 200-200-20 mg/5 mL suspension 5 mL (5 mLs Oral Given 3/31/23 0538)   LIDOcaine HCl 2% oral solution 5 mL (5 mLs Oral Given 3/31/23 0538)     Medical Decision Making:   Initial Assessment:   Patient with dysphagia and odynophagia with solids  Differential Diagnosis:   Esophagitis, esophageal stricture, GERD, gastritis                        Clinical Impression:   Final diagnoses:  [R13.10] Dysphagia, unspecified type (Primary)  [R07.9] Chest pain  [R13.19] Esophageal dysphagia        ED Disposition Condition    Discharge Stable          ED Prescriptions    None       Follow-up Information       Follow up With Specialties Details Why Contact Info    Follow up with [facility].   If symptoms worsen go to an emergency room in [place] where they are on call. [address] [place] [address]   [phone]             Checo Alvarez MD  03/31/23 9679

## 2023-03-31 NOTE — Clinical Note
Diagnosis: Chest pain [241472]   Future Attending Provider: MINNIE LEOS [[id number]]   Admitting Provider:: MINNIE LEOS [[id number]]

## 2023-04-01 VITALS
BODY MASS INDEX: 42.38 KG/M2 | DIASTOLIC BLOOD PRESSURE: 88 MMHG | WEIGHT: 270 LBS | RESPIRATION RATE: 16 BRPM | HEART RATE: 89 BPM | HEIGHT: 67 IN | TEMPERATURE: 98 F | OXYGEN SATURATION: 95 % | SYSTOLIC BLOOD PRESSURE: 144 MMHG

## 2023-04-01 LAB
ALBUMIN SERPL-MCNC: 4.2 G/DL (ref 3.5–5)
ALBUMIN/GLOB SERPL: 1.3 RATIO (ref 1.1–2)
ALP SERPL-CCNC: 69 UNIT/L (ref 40–150)
ALT SERPL-CCNC: 77 UNIT/L (ref 0–55)
AST SERPL-CCNC: 30 UNIT/L (ref 5–34)
BASOPHILS # BLD AUTO: 0.02 X10(3)/MCL (ref 0–0.2)
BASOPHILS NFR BLD AUTO: 0.2 %
BILIRUBIN DIRECT+TOT PNL SERPL-MCNC: 0.5 MG/DL
BUN SERPL-MCNC: 10.5 MG/DL (ref 8.9–20.6)
CALCIUM SERPL-MCNC: 10.2 MG/DL (ref 8.4–10.2)
CHLORIDE SERPL-SCNC: 102 MMOL/L (ref 98–107)
CO2 SERPL-SCNC: 26 MMOL/L (ref 22–29)
CREAT SERPL-MCNC: 0.89 MG/DL (ref 0.73–1.18)
EOSINOPHIL # BLD AUTO: 0 X10(3)/MCL (ref 0–0.9)
EOSINOPHIL NFR BLD AUTO: 0 %
ERYTHROCYTE [DISTWIDTH] IN BLOOD BY AUTOMATED COUNT: 13.9 % (ref 11.5–17)
GFR SERPLBLD CREATININE-BSD FMLA CKD-EPI: >60 MLS/MIN/1.73/M2
GLOBULIN SER-MCNC: 3.3 GM/DL (ref 2.4–3.5)
GLUCOSE SERPL-MCNC: 132 MG/DL (ref 74–100)
HCT VFR BLD AUTO: 43.6 % (ref 42–52)
HGB BLD-MCNC: 14.5 G/DL (ref 14–18)
IMM GRANULOCYTES # BLD AUTO: 0.11 X10(3)/MCL (ref 0–0.04)
IMM GRANULOCYTES NFR BLD AUTO: 0.8 %
LYMPHOCYTES # BLD AUTO: 2 X10(3)/MCL (ref 0.6–4.6)
LYMPHOCYTES NFR BLD AUTO: 15.3 %
MCH RBC QN AUTO: 28.7 PG (ref 27–31)
MCHC RBC AUTO-ENTMCNC: 33.3 G/DL (ref 33–36)
MCV RBC AUTO: 86.2 FL (ref 80–94)
MONOCYTES # BLD AUTO: 0.59 X10(3)/MCL (ref 0.1–1.3)
MONOCYTES NFR BLD AUTO: 4.5 %
NEUTROPHILS # BLD AUTO: 10.34 X10(3)/MCL (ref 2.1–9.2)
NEUTROPHILS NFR BLD AUTO: 79.2 %
NRBC BLD AUTO-RTO: 0 %
PLATELET # BLD AUTO: 326 X10(3)/MCL (ref 130–400)
PMV BLD AUTO: 9.7 FL (ref 7.4–10.4)
POTASSIUM SERPL-SCNC: 4.5 MMOL/L (ref 3.5–5.1)
PROT SERPL-MCNC: 7.5 GM/DL (ref 6.4–8.3)
RBC # BLD AUTO: 5.06 X10(6)/MCL (ref 4.7–6.1)
SODIUM SERPL-SCNC: 140 MMOL/L (ref 136–145)
WBC # SPEC AUTO: 13.1 X10(3)/MCL (ref 4.5–11.5)

## 2023-04-01 PROCEDURE — 80053 COMPREHEN METABOLIC PANEL: CPT | Performed by: NURSE PRACTITIONER

## 2023-04-01 PROCEDURE — 25000003 PHARM REV CODE 250

## 2023-04-01 PROCEDURE — 25000003 PHARM REV CODE 250: Performed by: INTERNAL MEDICINE

## 2023-04-01 PROCEDURE — 25000003 PHARM REV CODE 250: Performed by: NURSE PRACTITIONER

## 2023-04-01 PROCEDURE — 85025 COMPLETE CBC W/AUTO DIFF WBC: CPT | Performed by: NURSE PRACTITIONER

## 2023-04-01 PROCEDURE — C9113 INJ PANTOPRAZOLE SODIUM, VIA: HCPCS | Performed by: INTERNAL MEDICINE

## 2023-04-01 PROCEDURE — 63600175 PHARM REV CODE 636 W HCPCS: Performed by: INTERNAL MEDICINE

## 2023-04-01 PROCEDURE — 96375 TX/PRO/DX INJ NEW DRUG ADDON: CPT

## 2023-04-01 PROCEDURE — G0378 HOSPITAL OBSERVATION PER HR: HCPCS

## 2023-04-01 RX ORDER — MAG HYDROX/ALUMINUM HYD/SIMETH 200-200-20
SUSPENSION, ORAL (FINAL DOSE FORM) ORAL
Status: COMPLETED
Start: 2023-04-01 | End: 2023-04-01

## 2023-04-01 RX ORDER — LIDOCAINE HYDROCHLORIDE 20 MG/ML
15 SOLUTION OROPHARYNGEAL ONCE
Status: DISCONTINUED | OUTPATIENT
Start: 2023-04-01 | End: 2023-04-01 | Stop reason: HOSPADM

## 2023-04-01 RX ORDER — PANTOPRAZOLE SODIUM 40 MG/1
40 TABLET, DELAYED RELEASE ORAL
Qty: 60 TABLET | Refills: 2 | Status: SHIPPED | OUTPATIENT
Start: 2023-04-01 | End: 2023-11-27 | Stop reason: SDUPTHER

## 2023-04-01 RX ORDER — MAG HYDROX/ALUMINUM HYD/SIMETH 200-200-20
30 SUSPENSION, ORAL (FINAL DOSE FORM) ORAL ONCE
Status: DISCONTINUED | OUTPATIENT
Start: 2023-04-01 | End: 2023-04-01 | Stop reason: HOSPADM

## 2023-04-01 RX ORDER — LIDOCAINE HYDROCHLORIDE 20 MG/ML
SOLUTION OROPHARYNGEAL
Status: COMPLETED
Start: 2023-04-01 | End: 2023-04-01

## 2023-04-01 RX ORDER — PANTOPRAZOLE SODIUM 40 MG/1
40 TABLET, DELAYED RELEASE ORAL
Status: DISCONTINUED | OUTPATIENT
Start: 2023-04-01 | End: 2023-04-01 | Stop reason: HOSPADM

## 2023-04-01 RX ORDER — SUCRALFATE 1 G/1
1 TABLET ORAL
Qty: 40 TABLET | Refills: 0 | Status: SHIPPED | OUTPATIENT
Start: 2023-04-01 | End: 2023-04-11

## 2023-04-01 RX ADMIN — ACETAMINOPHEN 325MG 650 MG: 325 TABLET ORAL at 04:04

## 2023-04-01 RX ADMIN — PANTOPRAZOLE SODIUM 40 MG: 40 INJECTION, POWDER, FOR SOLUTION INTRAVENOUS at 08:04

## 2023-04-01 RX ADMIN — SUCRALFATE 1 G: 1 TABLET ORAL at 06:04

## 2023-04-01 RX ADMIN — LIDOCAINE HYDROCHLORIDE: 20 SOLUTION ORAL at 04:04

## 2023-04-01 RX ADMIN — ALUMINUM HYDROXIDE, MAGNESIUM HYDROXIDE, AND SIMETHICONE 30 ML: 200; 200; 20 SUSPENSION ORAL at 04:04

## 2023-04-01 NOTE — NURSING
Nurses Note -- 4 Eyes      3/31/2023   9:00 PM      Skin assessed during: Admit      [x] No Pressure Injuries Present    []Prevention Measures Documented      [] Yes- Altered Skin Integrity Present or Discovered   [] LDA Added if Not in Epic (Describe Wound)   [] New Altered Skin Integrity was Present on Admit and Documented in LDA   [] Wound Image Taken    Wound Care Consulted? No    Attending Nurse:  Oh Arceo RN     Second RN/Staff Member:  Gia Zamora RN.

## 2023-04-02 NOTE — DISCHARGE SUMMARY
[facility] Hospital Medicine Discharge Summary    Admit Date: 3/31/2023  Discharge Date and Time: 4/1/20237:19 PM  Admitting Physician: XIMENA Team  Discharging Physician: Lexx Seay MD.  Primary Care Physician: KRISTI Hess Jr, MD  Consults: Gastroenterology    Discharge Diagnoses:  Esophageal ulcer  HTN- essential, non complaint with home medication  HLD- non-compliant with home medication  Prostate cancer- s/p treatment  Deafness- chronic, with hearing aid use    Hospital Course:     Sam Horton is a 42 y.o. male who PMH includes HTN, HLD, prostate cancer, deafness; presents to the ED at [facility] on 3/31/2023 with a primary complaint of chest pain and sore throat which started a few days ago after eating chicken and rice.  Patient reports throat and chest pain worsens with swallowing liquids and solids.  He reports he was seen in the ED in [place] and subsequently discharged home. Pt reports his symptoms worsened and presented to the ED here for further evaluation. No reports of N/V/D fever, chills congestion, abdominal pain or any urinary symptoms. Labs demonstrated glucose 113, ALT 68, other indices unremarkable.  Initial VS /102 P 83 R 20 T 98.1F O2 saturation 96% on room air. Pt is admitted to hospital medicine services for further management. GI services consulted. Patient seen by GI team and EGD was done. Showed:    1.  Distal esophageal ulceration approximately 3 cm.   2. 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  Not biopsied.  3. Small hiatal hernia.  Otherwise normal exam.    He was placed on PO BID PPI and carafate. He will have a f/u with GI in 1 month and will need rpt EGD in 3 months.  He was started on a soft det and discharged home in a stable condition.     Pt was seen and examined on the day of discharge  Vitals:  VITAL SIGNS: 24 HRS MIN & MAX LAST   Temp  Min: 97.5 °F (36.4 °C)  Max: 98.3 °F (36.8 °C) 97.7 °F (36.5 °C)   BP  Min: 115/75  Max: 146/92  (!) 144/88     Pulse  Min: 88  Max: 104  89   Resp  Min: 16  Max: 20 16   SpO2  Min: 91 %  Max: 95 % 95 %       Physical Exam:  Heart RRR  Lungs clear   Abdomen soft and non tender   Neuro: No FND      Procedures Performed: No admission procedures for hospital encounter.     Significant Diagnostic Studies: See Full reports for all details    Recent Labs   Lab 03/31/23  0546 04/01/23  0408   WBC 11.4 13.1*   RBC 5.08 5.06   HGB 14.4 14.5   HCT 43.5 43.6   MCV 85.6 86.2   MCH 28.3 28.7   MCHC 33.1 33.3   RDW 13.2 13.9    326   MPV 9.3 9.7       Recent Labs   Lab 03/31/23  0546 04/01/23  0408    140   K 3.6 4.5   CO2 27 26   BUN 10.8 10.5   CREATININE 0.83 0.89   CALCIUM 10.1 10.2   ALBUMIN 4.0 4.2   ALKPHOS 67 69   ALT 68* 77*   AST 25 30   BILITOT 0.4 0.5        Microbiology Results (last 7 days)       ** No results found for the last 168 hours. **             CTA Chest Aorta Non Coronary  Narrative: EXAMINATION:  CTA CHEST AORTA NON CORONARY    CLINICAL HISTORY:  Aortic dissection suspected;severe chest pain, uncontrolled HTN (/134);    TECHNIQUE:  CTA imaging of the chest before and after IV contrast.  Axial, coronal and sagittal reformatted images reviewed.  3-D reconstructed and MIP images also reviewed for further assessment of the vasculature.   Dose length product is 1554 mGycm. Automatic exposure control, adjustment of mA/kV or iterative reconstruction technique used to limit radiation dose.    COMPARISON:  CT abdomen/pelvis 04/27/2016    FINDINGS:  Mild artifact along the ascending aorta related to cardiac motion.  No thoracic aortic aneurysm or convincing findings for thoracic aortic dissection.  Heart size within normal limits.  No pleural or pericardial effusion.    No pathologically enlarged thoracic lymph node.  No consolidation or suspicious pulmonary nodule.  Mild to moderate hepatic steatosis.  Prior cholecystectomy.  No acute osseous findings.  Impression: No thoracic aortic  aneurysm or dissection identified.    Electronically signed by: Renny Martines  Date:    03/31/2023  Time:    12:12  X-Ray Chest PA And Lateral  Narrative: EXAMINATION:  XR CHEST PA AND LATERAL    CLINICAL HISTORY:  Chest pain, unspecified    TECHNIQUE:  Two views of the chest    COMPARISON:  No prior imaging available for comparison.    FINDINGS:  No focal opacification, pleural effusion, or pneumothorax.    The cardiomediastinal silhouette is within normal limits.    No acute osseous abnormality.  Impression: No acute cardiopulmonary process.    Electronically signed by: Celestino Zhao  Date:    03/31/2023  Time:    10:23         Medication List        START taking these medications      pantoprazole 40 MG tablet  Commonly known as: PROTONIX  Take 1 tablet (40 mg total) by mouth 2 (two) times daily before meals.     sucralfate 1 gram tablet  Commonly known as: CARAFATE  Take 1 tablet (1 g total) by mouth 4 (four) times daily before meals and nightly. for 10 days               Where to Get Your Medications        These medications were sent to [id number] - [address] AT [address] [address]      Phone: [phone]   pantoprazole 40 MG tablet  sucralfate 1 gram tablet          Explained in detail to the patient about the discharge plan, medications, and follow-up visits. Pt understands and agrees with the treatment plan  Discharge Disposition: Home or Self Care   Discharged Condition: stable  Diet-    Medications Per DC med rec  Activities as tolerated   Follow-up Information       Tree Hendricks MD Follow up in 1 month(s).    Specialty: Gastroenterology  Contact information:  [address] [facility] [address] [phone]                           For further questions contact hospitalist office    Discharge time 33 minutes    For worsening symptoms, chest pain, shortness of breath, increased abdominal pain, high grade fever,  stroke or stroke like symptoms, immediately go to the nearest Emergency Room or call 911 as soon as possible.      Lexx Chand M.D, on 4/1/2023. at 7:19 PM.

## 2023-10-04 PROBLEM — R73.09 ELEVATED GLUCOSE: Status: ACTIVE | Noted: 2023-10-04

## 2023-10-04 PROBLEM — R73.03 PREDIABETES: Status: ACTIVE | Noted: 2023-10-04

## 2023-10-04 PROBLEM — K44.9 HIATAL HERNIA: Status: ACTIVE | Noted: 2023-10-04

## 2023-10-04 PROBLEM — I10 PRIMARY HYPERTENSION: Status: ACTIVE | Noted: 2023-10-04

## 2023-10-04 PROBLEM — R07.9 CHEST PAIN: Status: RESOLVED | Noted: 2023-03-31 | Resolved: 2023-10-04

## 2023-10-04 PROBLEM — E78.00 HYPERCHOLESTEROLEMIA: Status: ACTIVE | Noted: 2023-10-04

## 2024-05-30 PROBLEM — F41.9 ANXIETY: Status: ACTIVE | Noted: 2024-05-30

## 2024-05-30 PROBLEM — K21.9 GASTROESOPHAGEAL REFLUX DISEASE: Status: ACTIVE | Noted: 2024-05-30

## 2024-12-01 ENCOUNTER — HOSPITAL ENCOUNTER (EMERGENCY)
Facility: HOSPITAL | Age: 44
Discharge: HOME | End: 2024-12-01
Payer: COMMERCIAL

## 2024-12-01 VITALS
TEMPERATURE: 98 F | BODY MASS INDEX: 43.16 KG/M2 | DIASTOLIC BLOOD PRESSURE: 84 MMHG | RESPIRATION RATE: 16 BRPM | HEIGHT: 67 IN | HEART RATE: 85 BPM | WEIGHT: 275 LBS | OXYGEN SATURATION: 98 % | SYSTOLIC BLOOD PRESSURE: 134 MMHG

## 2024-12-01 DIAGNOSIS — R07.9 CHEST PAIN: ICD-10-CM

## 2024-12-01 LAB
ALBUMIN SERPL-MCNC: 3.7 G/DL (ref 3.5–5)
ALBUMIN/GLOB SERPL: 1 RATIO (ref 1.1–2)
ALP SERPL-CCNC: 58 UNIT/L (ref 40–150)
ALT SERPL-CCNC: 41 UNIT/L (ref 0–55)
ANION GAP SERPL CALC-SCNC: 7 MEQ/L
AST SERPL-CCNC: 22 UNIT/L (ref 5–34)
BASOPHILS # BLD AUTO: 0.03 X10(3)/MCL
BASOPHILS NFR BLD AUTO: 0.3 %
BILIRUB SERPL-MCNC: 0.3 MG/DL
BNP BLD-MCNC: <10 PG/ML
BUN SERPL-MCNC: 12.1 MG/DL (ref 8.9–20.6)
CALCIUM SERPL-MCNC: 9.2 MG/DL (ref 8.4–10.2)
CHLORIDE SERPL-SCNC: 106 MMOL/L (ref 98–107)
CO2 SERPL-SCNC: 26 MMOL/L (ref 22–29)
CREAT SERPL-MCNC: 0.94 MG/DL (ref 0.72–1.25)
CREAT/UREA NIT SERPL: 13
EOSINOPHIL # BLD AUTO: 0.16 X10(3)/MCL (ref 0–0.9)
EOSINOPHIL NFR BLD AUTO: 1.4 %
ERYTHROCYTE [DISTWIDTH] IN BLOOD BY AUTOMATED COUNT: 13.7 % (ref 11.5–17)
GFR SERPLBLD CREATININE-BSD FMLA CKD-EPI: >60 ML/MIN/1.73/M2
GLOBULIN SER-MCNC: 3.7 GM/DL (ref 2.4–3.5)
GLUCOSE SERPL-MCNC: 102 MG/DL (ref 74–100)
HCT VFR BLD AUTO: 47.3 % (ref 42–52)
HGB BLD-MCNC: 15.7 G/DL (ref 14–18)
IMM GRANULOCYTES # BLD AUTO: 0.09 X10(3)/MCL (ref 0–0.04)
IMM GRANULOCYTES NFR BLD AUTO: 0.8 %
LYMPHOCYTES # BLD AUTO: 3.9 X10(3)/MCL (ref 0.6–4.6)
LYMPHOCYTES NFR BLD AUTO: 34.8 %
MCH RBC QN AUTO: 29.5 PG (ref 27–31)
MCHC RBC AUTO-ENTMCNC: 33.2 G/DL (ref 33–36)
MCV RBC AUTO: 88.7 FL (ref 80–94)
MONOCYTES # BLD AUTO: 1.15 X10(3)/MCL (ref 0.1–1.3)
MONOCYTES NFR BLD AUTO: 10.2 %
NEUTROPHILS # BLD AUTO: 5.89 X10(3)/MCL (ref 2.1–9.2)
NEUTROPHILS NFR BLD AUTO: 52.5 %
PLATELET # BLD AUTO: 297 X10(3)/MCL (ref 130–400)
PMV BLD AUTO: 9.7 FL (ref 7.4–10.4)
POTASSIUM SERPL-SCNC: 4.4 MMOL/L (ref 3.5–5.1)
PROT SERPL-MCNC: 7.4 GM/DL (ref 6.4–8.3)
RBC # BLD AUTO: 5.33 X10(6)/MCL (ref 4.7–6.1)
SODIUM SERPL-SCNC: 139 MMOL/L (ref 136–145)
TROPONIN I SERPL-MCNC: 0.01 NG/ML (ref 0–0.04)
TROPONIN I SERPL-MCNC: <0.01 NG/ML (ref 0–0.04)
WBC # BLD AUTO: 11.22 X10(3)/MCL (ref 4.5–11.5)

## 2024-12-01 PROCEDURE — 84484 ASSAY OF TROPONIN QUANT: CPT | Performed by: EMERGENCY MEDICINE

## 2024-12-01 PROCEDURE — 99285 EMERGENCY DEPT VISIT HI MDM: CPT | Mod: 25

## 2024-12-01 PROCEDURE — 84484 ASSAY OF TROPONIN QUANT: CPT

## 2024-12-01 PROCEDURE — 80053 COMPREHEN METABOLIC PANEL: CPT

## 2024-12-01 PROCEDURE — 93005 ELECTROCARDIOGRAM TRACING: CPT

## 2024-12-01 PROCEDURE — 83880 ASSAY OF NATRIURETIC PEPTIDE: CPT

## 2024-12-01 PROCEDURE — 85025 COMPLETE CBC W/AUTO DIFF WBC: CPT

## 2024-12-01 NOTE — ED PROVIDER NOTES
"Encounter Date: 12/1/2024       History     Chief Complaint   Patient presents with    Chest Pain     44-year-old male with past medical history of hypertension, hyperlipidemia, acid reflux presents to the ED due to chest pain that began about 2 hours ago.  Patient describes it as centralized chest tightness and states it does not radiate anywhere.  Patient states he has never had chest pain like this before.  Denies any history of MI or cardiac stent.  Patient denies fever, chills, diaphoresis, shortness of breath, cough, abdominal pain, vomiting or diarrhea.  Patient states that when he checked his blood pressure at home, his systolic was elevated in the 170s which is not normal for him.  On my examination patient's systolic is in the 140s.          Review of patient's allergies indicates:   Allergen Reactions    Iodine Swelling and Rash    Aspirin Hives     Other reaction(s): NAUSEA,UPSET STOMACH    Shellfish containing products Hives    Gluten protein Nausea And Vomiting     "Patient states it is severe N/V"     Latex, natural rubber Rash    Tree nut Nausea And Vomiting     Past Medical History:   Diagnosis Date    Cancer     Essential (primary) hypertension     Obesity, unspecified     Testicular cancer      Past Surgical History:   Procedure Laterality Date    CHOLECYSTECTOMY      ESOPHAGOGASTRODUODENOSCOPY N/A 03/31/2023    Procedure: EGD (ESOPHAGOGASTRODUODENOSCOPY);  Surgeon: Ramon Mcguire MD;  Location: [facility];  Service: Gastroenterology;  Laterality: N/A;  For 5.00 pm today    ORCHIECTOMY Right 2021    due to testicular cancer     Family History   Problem Relation Name Age of Onset    Multiple sclerosis Mother      Heart disease Father          Aneurysm    Cholecystitis Father      Cholecystitis Sister      Nephrolithiasis Sister       Social History     Tobacco Use    Smoking status: Never    Smokeless tobacco: Never   Substance Use Topics    Alcohol use: No    Drug use: No     Review of " Systems    Physical Exam     Initial Vitals [12/01/24 0517]   BP Pulse Resp Temp SpO2   (!) 172/101 82 18 97.9 °F (36.6 °C) 97 %      MAP       --         Physical Exam    ED Course   Procedures  Labs Reviewed   COMPREHENSIVE METABOLIC PANEL - Abnormal       Result Value    Sodium 139      Potassium 4.4      Chloride 106      CO2 26      Glucose 102 (*)     Blood Urea Nitrogen 12.1      Creatinine 0.94      Calcium 9.2      Protein Total 7.4      Albumin 3.7      Globulin 3.7 (*)     Albumin/Globulin Ratio 1.0 (*)     Bilirubin Total 0.3      ALP 58      ALT 41      AST 22      eGFR >60      Anion Gap 7.0      BUN/Creatinine Ratio 13     CBC WITH DIFFERENTIAL - Abnormal    WBC 11.22      RBC 5.33      Hgb 15.7      Hct 47.3      MCV 88.7      MCH 29.5      MCHC 33.2      RDW 13.7      Platelet 297      MPV 9.7      Neut % 52.5      Lymph % 34.8      Mono % 10.2      Eos % 1.4      Basophil % 0.3      Lymph # 3.90      Neut # 5.89      Mono # 1.15      Eos # 0.16      Baso # 0.03      IG# 0.09 (*)     IG% 0.8     TROPONIN I - Normal    Troponin-I 0.010     B-TYPE NATRIURETIC PEPTIDE - Normal    Natriuretic Peptide <10.0     TROPONIN I - Normal    Troponin-I <0.010     CBC W/ AUTO DIFFERENTIAL    Narrative:     The following orders were created for panel order CBC auto differential.  Procedure                               Abnormality         Status                     ---------                               -----------         ------                     CBC with Differential[[id number]]       Abnormal            Final result                 Please view results for these tests on the individual orders.          Imaging Results              X-Ray Chest 1 View (Preliminary result)  Result time 12/01/24 05:45:40      Wet Read by Evelyn Clark DO (12/01/24 05:45:40, [facility] - Emergency Dept, Emergency Medicine)    No signs of acute inflammatory changes.                                     Medications -  No data to display  Medical Decision Making  Repeat troponin still negative. Patient is doing well. Feel OK to DC home.     Amount and/or Complexity of Data Reviewed  Labs: ordered. Decision-making details documented in ED Course.  Radiology: ordered and independent interpretation performed. Decision-making details documented in ED Course.               ED Course as of 24 0818   Sun Dec 01, 2024   0569 44-year-old male with past medical history of hypertension, hyperlipidemia, acid reflux presents to the ED due to chest pain that began about 2 hours ago.  Patient describes it as centralized chest tightness and states it does not radiate anywhere.  Patient states he has never had chest pain like this before.  Denies any history of MI or cardiac stent.  Patient denies fever, chills, diaphoresis, shortness of breath, cough, abdominal pain, vomiting or diarrhea.  Patient states that when he checked his blood pressure at home, his systolic was elevated in the 170s which is not normal for him.  On my examination patient's systolic is in the 140s. [NP]   0527 On examination patient is awake and alert.  Vital signs are stable.  Heart is regular rate and rhythm.  Lungs are clear.  Abdomen is soft nontender.  No swelling to lower extremities.  Bilateral radial and DP pulses are intact.    Differential diagnosis consists of but not limited to STEMI, NSTEMI, ACS related episode, dehydration, electrolyte abnormality, hypoglycemia. [NP]   0528 Patient is allergic to aspirin-gets hives and has difficulty breathing. [NP]   0529 EK beats per minute, normal sinus rhythm, HI interval 150, , no acute ST elevation.  T-wave inversion in lead III, AVR.  Similar to his past EKG from . [NP]   0545 Chest x-ray shows no signs of acute inflammatory changes or pulmonary edema. [NP]   0555 Heart score: 2 (HLD, HTN, obesity)  [NP]      ED Course User Index  [NP] Evelyn Clark,                            Clinical  Impression:  Final diagnoses:  [R07.9] Chest pain          ED Disposition Condition    Discharge Stable          ED Prescriptions    None       Follow-up Information       Follow up With Specialties Details Why Contact Info    MARIEL Hess Jr., MD Family Medicine Schedule an appointment as soon as possible for a visit  As needed, If symptoms worsen [address] [place] [id number]  [phone]               Checo Alvarez MD  12/01/24 0818

## 2024-12-02 LAB
OHS QRS DURATION: 102 MS
OHS QTC CALCULATION: 448 MS

## (undated) DEVICE — KIT SURGICAL COLON .25 1.1OZ

## (undated) DEVICE — SOL IRRI STRL WATER 1000ML

## (undated) DEVICE — TIP SUCTION YANKAUER

## (undated) DEVICE — COLLECTION SPECIMEN NEPTUNE

## (undated) DEVICE — KIT CANIST SUCTION 1200CC

## (undated) DEVICE — TUBING O2 FEMALE CONN 13FT